# Patient Record
Sex: FEMALE | Race: OTHER | NOT HISPANIC OR LATINO | URBAN - METROPOLITAN AREA
[De-identification: names, ages, dates, MRNs, and addresses within clinical notes are randomized per-mention and may not be internally consistent; named-entity substitution may affect disease eponyms.]

---

## 2023-08-13 ENCOUNTER — EMERGENCY (EMERGENCY)
Facility: HOSPITAL | Age: 88
LOS: 1 days | Discharge: HOME | End: 2023-08-13
Attending: EMERGENCY MEDICINE
Payer: MEDICAID

## 2023-08-13 VITALS
OXYGEN SATURATION: 98 % | SYSTOLIC BLOOD PRESSURE: 199 MMHG | WEIGHT: 88.18 LBS | TEMPERATURE: 98 F | DIASTOLIC BLOOD PRESSURE: 76 MMHG | HEIGHT: 57 IN | RESPIRATION RATE: 16 BRPM | HEART RATE: 62 BPM

## 2023-08-13 LAB
ALBUMIN SERPL ELPH-MCNC: 4.4 G/DL — SIGNIFICANT CHANGE UP (ref 3.3–5)
ALP SERPL-CCNC: 114 U/L — SIGNIFICANT CHANGE UP (ref 40–120)
ALT FLD-CCNC: 15 U/L — SIGNIFICANT CHANGE UP (ref 10–45)
ANION GAP SERPL CALC-SCNC: 12 MMOL/L — SIGNIFICANT CHANGE UP (ref 5–17)
AST SERPL-CCNC: 27 U/L — SIGNIFICANT CHANGE UP (ref 10–40)
BASOPHILS # BLD AUTO: 0.06 K/UL — SIGNIFICANT CHANGE UP (ref 0–0.2)
BASOPHILS NFR BLD AUTO: 0.7 % — SIGNIFICANT CHANGE UP (ref 0–2)
BILIRUB SERPL-MCNC: 0.3 MG/DL — SIGNIFICANT CHANGE UP (ref 0.2–1.2)
BUN SERPL-MCNC: 26 MG/DL — HIGH (ref 7–23)
CALCIUM SERPL-MCNC: 9.1 MG/DL — SIGNIFICANT CHANGE UP (ref 8.4–10.5)
CHLORIDE SERPL-SCNC: 96 MMOL/L — SIGNIFICANT CHANGE UP (ref 96–108)
CO2 SERPL-SCNC: 24 MMOL/L — SIGNIFICANT CHANGE UP (ref 22–31)
CREAT SERPL-MCNC: 1.03 MG/DL — SIGNIFICANT CHANGE UP (ref 0.5–1.3)
EGFR: 52 ML/MIN/1.73M2 — LOW
EOSINOPHIL # BLD AUTO: 0.38 K/UL — SIGNIFICANT CHANGE UP (ref 0–0.5)
EOSINOPHIL NFR BLD AUTO: 4.4 % — SIGNIFICANT CHANGE UP (ref 0–6)
GLUCOSE SERPL-MCNC: 99 MG/DL — SIGNIFICANT CHANGE UP (ref 70–99)
HCT VFR BLD CALC: 36.9 % — SIGNIFICANT CHANGE UP (ref 34.5–45)
HGB BLD-MCNC: 12.3 G/DL — SIGNIFICANT CHANGE UP (ref 11.5–15.5)
IMM GRANULOCYTES NFR BLD AUTO: 0.6 % — SIGNIFICANT CHANGE UP (ref 0–0.9)
LIDOCAIN IGE QN: 60 U/L — SIGNIFICANT CHANGE UP (ref 7–60)
LYMPHOCYTES # BLD AUTO: 3.97 K/UL — HIGH (ref 1–3.3)
LYMPHOCYTES # BLD AUTO: 45.8 % — HIGH (ref 13–44)
MCHC RBC-ENTMCNC: 30.2 PG — SIGNIFICANT CHANGE UP (ref 27–34)
MCHC RBC-ENTMCNC: 33.3 GM/DL — SIGNIFICANT CHANGE UP (ref 32–36)
MCV RBC AUTO: 90.7 FL — SIGNIFICANT CHANGE UP (ref 80–100)
MONOCYTES # BLD AUTO: 0.9 K/UL — SIGNIFICANT CHANGE UP (ref 0–0.9)
MONOCYTES NFR BLD AUTO: 10.4 % — SIGNIFICANT CHANGE UP (ref 2–14)
NEUTROPHILS # BLD AUTO: 3.3 K/UL — SIGNIFICANT CHANGE UP (ref 1.8–7.4)
NEUTROPHILS NFR BLD AUTO: 38.1 % — LOW (ref 43–77)
NRBC # BLD: 0 /100 WBCS — SIGNIFICANT CHANGE UP (ref 0–0)
PLATELET # BLD AUTO: 180 K/UL — SIGNIFICANT CHANGE UP (ref 150–400)
POTASSIUM SERPL-MCNC: 4 MMOL/L — SIGNIFICANT CHANGE UP (ref 3.5–5.3)
POTASSIUM SERPL-SCNC: 4 MMOL/L — SIGNIFICANT CHANGE UP (ref 3.5–5.3)
PROT SERPL-MCNC: 7.5 G/DL — SIGNIFICANT CHANGE UP (ref 6–8.3)
RBC # BLD: 4.07 M/UL — SIGNIFICANT CHANGE UP (ref 3.8–5.2)
RBC # FLD: 13.3 % — SIGNIFICANT CHANGE UP (ref 10.3–14.5)
SODIUM SERPL-SCNC: 132 MMOL/L — LOW (ref 135–145)
WBC # BLD: 8.66 K/UL — SIGNIFICANT CHANGE UP (ref 3.8–10.5)
WBC # FLD AUTO: 8.66 K/UL — SIGNIFICANT CHANGE UP (ref 3.8–10.5)

## 2023-08-13 PROCEDURE — 99285 EMERGENCY DEPT VISIT HI MDM: CPT

## 2023-08-13 RX ORDER — ACETAMINOPHEN 500 MG
975 TABLET ORAL ONCE
Refills: 0 | Status: COMPLETED | OUTPATIENT
Start: 2023-08-13 | End: 2023-08-14

## 2023-08-13 NOTE — ED PROVIDER NOTE - ATTENDING CONTRIBUTION TO CARE
MD Bacon:  patient seen and evaluated personally.   I agree with the History & Physical,  Impression & Plan other than what was detailed in my note.  MD Bacon  89 y/o f hx of "thyroid problems", presents to ed w/ cc of 1 month of r flank/back pain that is radiating to front and now abd pain, also has pain coming from her head down to her back x 1 month, no associated n/v, no diarrhea, no bloody stool, hx of k stones, maybe some burning w/ urination, no cp, sob, no leg swelling, no hemoptysis, afebrile vitals stable  non toxic well appearing, NC/AT,  conjunctiva non conjected, sclera anicteric, moist mucous membranes, neck supple, heart sounds, normal, no mrg, lungs cta b/l no wrr, abd soft non distended w/ mild rlq and sp tenderness, no visual deformities of extremities, axox3, , normal mood and affect, possible uti vs pyelo vs infected stone, less likely pancreatitis, sbo, will get cbc, cmp, lipase, ct scan, ua, pain meds, re eval.

## 2023-08-13 NOTE — ED PROVIDER NOTE - NSFOLLOWUPINSTRUCTIONS_ED_ALL_ED_FT
You were seen in the emergency department for abdominal pain.  Laboratory work and CT scan of your abdomen was performed that was indicative for an ascending urinary tract infection.  This is a common diagnosis however can lead to complications in your kidney.  This is treated with antibiotics, and you will be sent home with an antibiotic to take.  If you develop fevers, chills, chest pain, shortness of breath, increasing abdominal pain, pain with urination, blood in your urine, or increased general pain, please return to the emergency department.     Urinary Tract Infection, Adult    A urinary tract infection (UTI) is an infection of any part of the urinary tract. The urinary tract includes the kidneys, ureters, bladder, and urethra. These organs make, store, and get rid of urine in the body.    An upper UTI affects the ureters and kidneys. A lower UTI affects the bladder and urethra.    What are the causes?  Most urinary tract infections are caused by bacteria in your genital area around your urethra, where urine leaves your body. These bacteria grow and cause inflammation of your urinary tract.    What increases the risk?  You are more likely to develop this condition if:  You have a urinary catheter that stays in place.  You are not able to control when you urinate or have a bowel movement (incontinence).  You are female and you:  Use a spermicide or diaphragm for birth control.  Have low estrogen levels.  Are pregnant.  You have certain genes that increase your risk.  You are sexually active.  You take antibiotic medicines.  You have a condition that causes your flow of urine to slow down, such as:  An enlarged prostate, if you are male.  Blockage in your urethra.  A kidney stone.  A nerve condition that affects your bladder control (neurogenic bladder).  Not getting enough to drink, or not urinating often.  You have certain medical conditions, such as:  Diabetes.  A weak disease-fighting system (immunesystem).  Sickle cell disease.  Gout.  Spinal cord injury.  What are the signs or symptoms?  Symptoms of this condition include:  Needing to urinate right away (urgency).  Frequent urination. This may include small amounts of urine each time you urinate.  Pain or burning with urination.  Blood in the urine.  Urine that smells bad or unusual.  Trouble urinating.  Cloudy urine.  Vaginal discharge, if you are female.  Pain in the abdomen or the lower back.  You may also have:  Vomiting or a decreased appetite.  Confusion.  Irritability or tiredness.  A fever or chills.  Diarrhea.  The first symptom in older adults may be confusion. In some cases, they may not have any symptoms until the infection has worsened.    How is this diagnosed?  This condition is diagnosed based on your medical history and a physical exam. You may also have other tests, including:  Urine tests.  Blood tests.  Tests for STIs (sexually transmitted infections).  If you have had more than one UTI, a cystoscopy or imaging studies may be done to determine the cause of the infections.    How is this treated?  Treatment for this condition includes:  Antibiotic medicine.  Over-the-counter medicines to treat discomfort.  Drinking enough water to stay hydrated.  If you have frequent infections or have other conditions such as a kidney stone, you may need to see a health care provider who specializes in the urinary tract (urologist).    In rare cases, urinary tract infections can cause sepsis. Sepsis is a life-threatening condition that occurs when the body responds to an infection. Sepsis is treated in the hospital with IV antibiotics, fluids, and other medicines.    Follow these instructions at home:    Medicines    Take over-the-counter and prescription medicines only as told by your health care provider.  If you were prescribed an antibiotic medicine, take it as told by your health care provider. Do not stop using the antibiotic even if you start to feel better.  General instructions    Make sure you:  Empty your bladder often and completely. Do not hold urine for long periods of time.  Empty your bladder after sex.  Wipe from front to back after urinating or having a bowel movement if you are female. Use each tissue only one time when you wipe.  Drink enough fluid to keep your urine pale yellow.  Keep all follow-up visits. This is important.  Contact a health care provider if:  Your symptoms do not get better after 1–2 days.  Your symptoms go away and then return.  Get help right away if:  You have severe pain in your back or your lower abdomen.  You have a fever or chills.  You have nausea or vomiting.  Summary  A urinary tract infection (UTI) is an infection of any part of the urinary tract, which includes the kidneys, ureters, bladder, and urethra.  Most urinary tract infections are caused by bacteria in your genital area.  Treatment for this condition often includes antibiotic medicines.  If you were prescribed an antibiotic medicine, take it as told by your health care provider. Do not stop using the antibiotic even if you start to feel better.  Keep all follow-up visits. This is important.  This information is not intended to replace advice given to you by your health care provider. Make sure you discuss any questions you have with your health care provider.

## 2023-08-13 NOTE — ED ADULT NURSE NOTE - OBJECTIVE STATEMENT
89 y/o female A&OX3, came to the ED with complaints of right abdominal pains and cramping, radiating to her right flank, diarrhea, N, dysuria and felt like she had a fever but no temperature was taken. Denies CP, SOB, V, chills, hematuria.

## 2023-08-13 NOTE — ED ADULT NURSE NOTE - NSFALLHARMRISKINTERV_ED_ALL_ED

## 2023-08-13 NOTE — ED PROVIDER NOTE - MUSCULOSKELETAL MINIMAL EXAM
Tenderness elicited over the upper thoracic spine. No step-offs, no obvious signs of trauma./atraumatic

## 2023-08-13 NOTE — ED PROVIDER NOTE - PATIENT PORTAL LINK FT
You can access the FollowMyHealth Patient Portal offered by [facility] by registering at the following website: [email or web address]. By joining [facility]’s FollowMyHealth portal, you will also be able to view your health information using other applications (apps) compatible with our system.

## 2023-08-13 NOTE — ED PROVIDER NOTE - CLINICAL SUMMARY MEDICAL DECISION MAKING FREE TEXT BOX
Faina Brad is an 88 y.o F with PMHx significant for hypothyroidism, HTN, who presents to the ED with ongoing upper abd pain x2 weeks Faina Salinas is an 88 y.o F with PMHx significant for hypothyroidism, HTN, who presents to the ED with ongoing upper abd pain x2 weeks. Concerning differentials included but not limited to UTI, pyelonephritis, appendicitis, gastritis, cholecystitis. Will obtain basic labs, lipase, UA, CT A/P to r/o intraabdominal pathology.

## 2023-08-13 NOTE — ED PROVIDER NOTE - NSPTACCESSSVCSAPPTDETAILS_ED_ALL_ED_FT
This patient is from [facility] and currently does not have a primary care physician.  Family is trying to help with insurance however they are running its difficulties.  Please help

## 2023-08-13 NOTE — ED PROVIDER NOTE - PROGRESS NOTE DETAILS
Informed of results of labs and CT scan.  Indicative of ascending UTI. Will provide antibiotics at discharge for treatment of UTI

## 2023-08-13 NOTE — ED PROVIDER NOTE - OBJECTIVE STATEMENT
Faina Salinas is an 88 y.o F with PMHx significant for hypothyroidism, HTN, who presents to the ED with ongoing upper abd pain x2 weeks.  Patient is a native M[place]ese speaker from [place] who is present with her granddaughter who provides the HPI.  Per granddaughter, Ms. Salinas has had upper abd pain that radiates to her flanks bilaterally.  She reports nothing has made it particularly worse however, she has had minimal relief after taking Tylenol. Additionally, she complains of a HA that is located at the top of her head without photophobia and burning with urination. She currently continues to c.o. a HA. She otherwise denies being sick or around any sick contacts, denies F/C, N/V, SOB, CP, hematuria, constipation, diarrhea.  Granddaughter does report the bilateral cramping of the legs or arthralgias.

## 2023-08-14 VITALS
HEART RATE: 57 BPM | SYSTOLIC BLOOD PRESSURE: 173 MMHG | TEMPERATURE: 98 F | DIASTOLIC BLOOD PRESSURE: 65 MMHG | OXYGEN SATURATION: 97 % | RESPIRATION RATE: 18 BRPM

## 2023-08-14 LAB
APPEARANCE UR: CLEAR — SIGNIFICANT CHANGE UP
BACTERIA # UR AUTO: NEGATIVE — SIGNIFICANT CHANGE UP
BILIRUB UR-MCNC: NEGATIVE — SIGNIFICANT CHANGE UP
COLOR SPEC: COLORLESS — SIGNIFICANT CHANGE UP
DIFF PNL FLD: ABNORMAL
EPI CELLS # UR: 0 /HPF — SIGNIFICANT CHANGE UP
GLUCOSE UR QL: NEGATIVE — SIGNIFICANT CHANGE UP
KETONES UR-MCNC: NEGATIVE — SIGNIFICANT CHANGE UP
LEUKOCYTE ESTERASE UR-ACNC: ABNORMAL
NITRITE UR-MCNC: NEGATIVE — SIGNIFICANT CHANGE UP
PH UR: 6 — SIGNIFICANT CHANGE UP (ref 5–8)
PROT UR-MCNC: NEGATIVE — SIGNIFICANT CHANGE UP
RBC CASTS # UR COMP ASSIST: 1 /HPF — SIGNIFICANT CHANGE UP (ref 0–4)
SP GR SPEC: 1.01 — LOW (ref 1.01–1.02)
UROBILINOGEN FLD QL: NEGATIVE — SIGNIFICANT CHANGE UP
WBC UR QL: 5 /HPF — SIGNIFICANT CHANGE UP (ref 0–5)

## 2023-08-14 PROCEDURE — 81001 URINALYSIS AUTO W/SCOPE: CPT

## 2023-08-14 PROCEDURE — 74177 CT ABD & PELVIS W/CONTRAST: CPT | Mod: MA

## 2023-08-14 PROCEDURE — 74177 CT ABD & PELVIS W/CONTRAST: CPT | Mod: 26,MA

## 2023-08-14 PROCEDURE — 99285 EMERGENCY DEPT VISIT HI MDM: CPT | Mod: 25

## 2023-08-14 PROCEDURE — 96374 THER/PROPH/DIAG INJ IV PUSH: CPT

## 2023-08-14 PROCEDURE — 85025 COMPLETE CBC W/AUTO DIFF WBC: CPT

## 2023-08-14 PROCEDURE — 83690 ASSAY OF LIPASE: CPT

## 2023-08-14 PROCEDURE — 87086 URINE CULTURE/COLONY COUNT: CPT

## 2023-08-14 PROCEDURE — 80053 COMPREHEN METABOLIC PANEL: CPT

## 2023-08-14 RX ORDER — CEFDINIR 250 MG/5ML
1 POWDER, FOR SUSPENSION ORAL
Qty: 14 | Refills: 0
Start: 2023-08-14 | End: 2023-08-20

## 2023-08-14 RX ORDER — CEFTRIAXONE 500 MG/1
1000 INJECTION, POWDER, FOR SOLUTION INTRAMUSCULAR; INTRAVENOUS ONCE
Refills: 0 | Status: COMPLETED | OUTPATIENT
Start: 2023-08-14 | End: 2023-08-14

## 2023-08-14 RX ADMIN — Medication 975 MILLIGRAM(S): at 06:57

## 2023-08-14 RX ADMIN — Medication 975 MILLIGRAM(S): at 00:18

## 2023-08-14 RX ADMIN — CEFTRIAXONE 100 MILLIGRAM(S): 500 INJECTION, POWDER, FOR SOLUTION INTRAMUSCULAR; INTRAVENOUS at 06:00

## 2023-08-15 LAB
CULTURE RESULTS: SIGNIFICANT CHANGE UP
SPECIMEN SOURCE: SIGNIFICANT CHANGE UP

## 2023-08-16 NOTE — ED POST DISCHARGE NOTE - ADDITIONAL DOCUMENTATION
8/18/23 Alejandro BOOTH- daughter called back. via  , informed her of results. mother is feeling much better. seems to have responded appropriately to the antibiotic. pt gets some abdominal pain when taking the antibiotic but otherwise is feeling better. encouraged probiotic. urinary symptoms improved. encouraged continue antibiotic and to f/up outpatient with pcp. medicine clinic information given to her to set up follow up. instructed to return to hospital if worsening symptoms. 8/18/23 Alejandro BOOTH- daughter called back. via  #[phone] , informed her of results. mother is feeling much better. seems to have responded appropriately to the antibiotic. pt gets some abdominal pain when taking the antibiotic but otherwise is feeling better. encouraged probiotic. urinary symptoms improved. encouraged continue antibiotic and to f/up outpatient with pcp. medicine clinic information given to her to set up follow up. instructed to return to hospital if worsening symptoms.

## 2023-08-16 NOTE — ED POST DISCHARGE NOTE - DETAILS
8/16: sent on abx, would ensure patient feeling improved. lvm using language line  Libby [id number] 8/17/23 Alejandro BOOTH - via  #[id number], no answer, left v/m. pt on cefuroxime. 8/17/23 Alejandro BOOTH - via  #[id number], no answer, left v/m. pt on cefdinir. 8/18/23 Alejandro BOOTH- via  #[phone], attempted, no answer, left v/m on primary number. also attempted granddaughter, no answer. left v/m via . Cefdinir should be an appropriate management of Group B strep. no further attempts to contact patient necessary at this point.

## 2023-09-08 ENCOUNTER — INPATIENT (INPATIENT)
Facility: HOSPITAL | Age: 88
LOS: 6 days | Discharge: HOME HEALTH | DRG: 643 | End: 2023-09-15
Attending: STUDENT IN AN ORGANIZED HEALTH CARE EDUCATION/TRAINING PROGRAM | Admitting: STUDENT IN AN ORGANIZED HEALTH CARE EDUCATION/TRAINING PROGRAM
Payer: MEDICAID

## 2023-09-08 VITALS
HEIGHT: 59 IN | OXYGEN SATURATION: 99 % | RESPIRATION RATE: 18 BRPM | SYSTOLIC BLOOD PRESSURE: 155 MMHG | HEART RATE: 55 BPM | DIASTOLIC BLOOD PRESSURE: 84 MMHG | TEMPERATURE: 99 F | WEIGHT: 89.95 LBS

## 2023-09-08 LAB
ALBUMIN SERPL ELPH-MCNC: 4.5 G/DL — SIGNIFICANT CHANGE UP (ref 3.3–5)
ALP SERPL-CCNC: 92 U/L — SIGNIFICANT CHANGE UP (ref 40–120)
ALT FLD-CCNC: 19 U/L — SIGNIFICANT CHANGE UP (ref 10–45)
ANION GAP SERPL CALC-SCNC: 13 MMOL/L — SIGNIFICANT CHANGE UP (ref 5–17)
APPEARANCE UR: CLEAR — SIGNIFICANT CHANGE UP
AST SERPL-CCNC: 32 U/L — SIGNIFICANT CHANGE UP (ref 10–40)
BACTERIA # UR AUTO: NEGATIVE — SIGNIFICANT CHANGE UP
BASOPHILS # BLD AUTO: 0.01 K/UL — SIGNIFICANT CHANGE UP (ref 0–0.2)
BASOPHILS NFR BLD AUTO: 0.2 % — SIGNIFICANT CHANGE UP (ref 0–2)
BILIRUB SERPL-MCNC: 0.6 MG/DL — SIGNIFICANT CHANGE UP (ref 0.2–1.2)
BILIRUB UR-MCNC: NEGATIVE — SIGNIFICANT CHANGE UP
BUN SERPL-MCNC: 8 MG/DL — SIGNIFICANT CHANGE UP (ref 7–23)
CALCIUM SERPL-MCNC: 9.2 MG/DL — SIGNIFICANT CHANGE UP (ref 8.4–10.5)
CHLORIDE SERPL-SCNC: 89 MMOL/L — LOW (ref 96–108)
CO2 SERPL-SCNC: 22 MMOL/L — SIGNIFICANT CHANGE UP (ref 22–31)
COLOR SPEC: COLORLESS — SIGNIFICANT CHANGE UP
CREAT SERPL-MCNC: 0.68 MG/DL — SIGNIFICANT CHANGE UP (ref 0.5–1.3)
DIFF PNL FLD: NEGATIVE — SIGNIFICANT CHANGE UP
EGFR: 84 ML/MIN/1.73M2 — SIGNIFICANT CHANGE UP
EOSINOPHIL # BLD AUTO: 0.08 K/UL — SIGNIFICANT CHANGE UP (ref 0–0.5)
EOSINOPHIL NFR BLD AUTO: 1.3 % — SIGNIFICANT CHANGE UP (ref 0–6)
EPI CELLS # UR: 1 /HPF — SIGNIFICANT CHANGE UP
GLUCOSE SERPL-MCNC: 113 MG/DL — HIGH (ref 70–99)
GLUCOSE UR QL: NEGATIVE — SIGNIFICANT CHANGE UP
HCT VFR BLD CALC: 36.4 % — SIGNIFICANT CHANGE UP (ref 34.5–45)
HGB BLD-MCNC: 12.7 G/DL — SIGNIFICANT CHANGE UP (ref 11.5–15.5)
HYALINE CASTS # UR AUTO: 0 /LPF — SIGNIFICANT CHANGE UP (ref 0–2)
IMM GRANULOCYTES NFR BLD AUTO: 0.8 % — SIGNIFICANT CHANGE UP (ref 0–0.9)
KETONES UR-MCNC: NEGATIVE — SIGNIFICANT CHANGE UP
LEUKOCYTE ESTERASE UR-ACNC: ABNORMAL
LIDOCAIN IGE QN: 59 U/L — SIGNIFICANT CHANGE UP (ref 7–60)
LYMPHOCYTES # BLD AUTO: 1.91 K/UL — SIGNIFICANT CHANGE UP (ref 1–3.3)
LYMPHOCYTES # BLD AUTO: 30.9 % — SIGNIFICANT CHANGE UP (ref 13–44)
MCHC RBC-ENTMCNC: 30.3 PG — SIGNIFICANT CHANGE UP (ref 27–34)
MCHC RBC-ENTMCNC: 34.9 GM/DL — SIGNIFICANT CHANGE UP (ref 32–36)
MCV RBC AUTO: 86.9 FL — SIGNIFICANT CHANGE UP (ref 80–100)
MONOCYTES # BLD AUTO: 1.01 K/UL — HIGH (ref 0–0.9)
MONOCYTES NFR BLD AUTO: 16.3 % — HIGH (ref 2–14)
NEUTROPHILS # BLD AUTO: 3.13 K/UL — SIGNIFICANT CHANGE UP (ref 1.8–7.4)
NEUTROPHILS NFR BLD AUTO: 50.5 % — SIGNIFICANT CHANGE UP (ref 43–77)
NITRITE UR-MCNC: NEGATIVE — SIGNIFICANT CHANGE UP
NRBC # BLD: 0 /100 WBCS — SIGNIFICANT CHANGE UP (ref 0–0)
PH UR: 7.5 — SIGNIFICANT CHANGE UP (ref 5–8)
PLATELET # BLD AUTO: 314 K/UL — SIGNIFICANT CHANGE UP (ref 150–400)
POTASSIUM SERPL-MCNC: 3.7 MMOL/L — SIGNIFICANT CHANGE UP (ref 3.5–5.3)
POTASSIUM SERPL-SCNC: 3.7 MMOL/L — SIGNIFICANT CHANGE UP (ref 3.5–5.3)
PROT SERPL-MCNC: 8.3 G/DL — SIGNIFICANT CHANGE UP (ref 6–8.3)
PROT UR-MCNC: SIGNIFICANT CHANGE UP
RBC # BLD: 4.19 M/UL — SIGNIFICANT CHANGE UP (ref 3.8–5.2)
RBC # FLD: 13 % — SIGNIFICANT CHANGE UP (ref 10.3–14.5)
RBC CASTS # UR COMP ASSIST: 2 /HPF — SIGNIFICANT CHANGE UP (ref 0–4)
SODIUM SERPL-SCNC: 124 MMOL/L — LOW (ref 135–145)
SP GR SPEC: 1.01 — LOW (ref 1.01–1.02)
TROPONIN T, HIGH SENSITIVITY RESULT: 14 NG/L — SIGNIFICANT CHANGE UP (ref 0–51)
UROBILINOGEN FLD QL: NEGATIVE — SIGNIFICANT CHANGE UP
WBC # BLD: 6.19 K/UL — SIGNIFICANT CHANGE UP (ref 3.8–10.5)
WBC # FLD AUTO: 6.19 K/UL — SIGNIFICANT CHANGE UP (ref 3.8–10.5)
WBC UR QL: 3 /HPF — SIGNIFICANT CHANGE UP (ref 0–5)

## 2023-09-08 PROCEDURE — 71045 X-RAY EXAM CHEST 1 VIEW: CPT | Mod: 26

## 2023-09-08 PROCEDURE — 74177 CT ABD & PELVIS W/CONTRAST: CPT | Mod: 26,MA

## 2023-09-08 PROCEDURE — 99285 EMERGENCY DEPT VISIT HI MDM: CPT

## 2023-09-08 NOTE — ED ADULT NURSE NOTE - OBJECTIVE STATEMENT
87YO female with PMH o HTN and osteoporosis presenting with complaints of  abdominal pain. Pt states for the last four days, having epigastric pain, on saturday pt noticed in BM dark blood in stool, pt also had one episode of diarrhea on saturday. Pt was recently dx with UTI finished course of treatment is still having burning upon urination. Pt c/o epigastric pain, nausea, SOB and overweakness. As per daughter pt seeems a bit confused at times, normally A&Ox4 at baseline, pt has also decreased PO intake do to abdominal discomfort. Pt Axox3 not to year, respirations even, & non-labored. radial pulses strong and equal bilaterally. Skin warm, dry, and intact. Pt placed in position of comfort. Daughter at bedside. Bed in lowest position, wheels locked, appropriate side rails raised. Pt denies needs at this time.

## 2023-09-08 NOTE — ED PROVIDER NOTE - CLINICAL SUMMARY MEDICAL DECISION MAKING FREE TEXT BOX
88-year-old female with history of HTN, hypothyroidism presents with 1 week of increasing in severity abdominal pain associated with 1 episode of blood in her stool and nausea.  Concern for colitis versus GIB versus chronic UTI.  Labs, EKG, CXR, CT A/P, UA/urine culture ordered.  Likely admission for ongoing management.

## 2023-09-08 NOTE — ED ADULT NURSE NOTE - NS ED NURSE PATIENT LEFT UNIT TIME
"Pt arrived to ER with complaints of " I have been exercising and I feel like my legs are broken". Pt reports pain in the right knee, rates it as 5/10 at present. Pt reports increased pain after waking up in the morning.   " 08:30

## 2023-09-08 NOTE — ED PROVIDER NOTE - PROGRESS NOTE DETAILS
Endorsed to Dr JOVANNI Casper MD, Facep Patient signed out to me, clinically stable.  Given altered mental status will obtain thyroid panel as well as urine lytes   To better assess hyponatremia.  Spoke to granddaughter who is at bedside, states that she is unsure of what blood pressure medications he takes.  Will contact family member to check.  CT results still pending, plan for admission after. Mynor Hammer, ED Attending Martha Randle- spoke to hospitalist for admission. updated family

## 2023-09-08 NOTE — ED PROVIDER NOTE - PHYSICAL EXAMINATION
GENERAL: well appearing in no acute distress, non-toxic appearing  HEAD: normocephalic, atraumatic  HEENT: normal conjunctiva, oral mucosa dry, uvula midline, neck supple  CARDIAC: regular rate and rhythm, normal S1S2, no appreciable murmurs  PULM: normal breath sounds, clear to ascultation bilaterally, no rales, rhonchi, wheezing  GI: abdomen nondistended, soft, nontender  : no CVA tenderness b/l, no suprapubic tenderness  NEURO: moving all 4 extremities, no focal deficits, normal speech, AOx3  MSK: no peripheral edema, no calf tenderness b/l  SKIN: well-perfused, extremities warm

## 2023-09-08 NOTE — ED PROVIDER NOTE - OBJECTIVE STATEMENT
89 yo F with history of HTN, hypothyroidism presents with 1 week of abd pain.  Patient and daughter report that she was admitted to the hospital about a week ago for a urinary tract infection discharged with antibiotics and was feeling improved. Today she presents with abdominal pain and concern for lightheadedness and presyncope.  She had a fever 2 days ago and tested positive for COVID about 2 weeks ago.  She has had ongoing symptoms of burning with urination and nausea but no vomiting.  She also noticed some blood in her stool couple of days ago.  Otherwise patient has no headache, chest pain, shortness of breath, peripheral edema.

## 2023-09-08 NOTE — ED PROVIDER NOTE - ATTENDING CONTRIBUTION TO CARE
Private Physician None  88y hisp female PMH HTN, H Private Physician None  Declined translation services translated by deshawn  88y hisp female PMH HTN, Hypothyrodism see last mo (different registration Faina Salinas [id number]) Had presented w abd pn dx as abd pain ct ?cystitis cultures positive for GBS, Pt was dc home on abx and had improved. Pt now comes to ed c/o abd pain , burning dizziness w near syncope today. Had fever 101 two day ago . Had tested positive for covid 10d ago. Has burning dysuria, chills, nausea w/o vomiting. Private Physician None  Declined translation services translated by deshawn  88y hisp female PMH Born [facility],  HTN, Hypothyrodism see last mo (different registration Faina Salinas [id number]) Had presented w abd pn dx as abd pain ct ?cystitis cultures positive for GBS, Pt was dc home on abx and had improved. Pt now comes to ed c/o abd pain , burning dizziness w near syncope today. Had fever 101 two day ago . Had tested positive for covid 10d ago. Has burning dysuria, chills, nausea w/o vomiting. +blood in stool few days ago. PE Eldelry feamle awake alert normocephalic atraumatic neck supple chest clear anterior & posterior cv no rubs, gallops or murmurs abd mild suprapubic ttp no rebound guarding masses. Neruo gcs 15 speech fluent power 5/5 all extr  Reynaldo Casper MD, Facep

## 2023-09-09 DIAGNOSIS — N39.0 URINARY TRACT INFECTION, SITE NOT SPECIFIED: ICD-10-CM

## 2023-09-09 DIAGNOSIS — E87.1 HYPO-OSMOLALITY AND HYPONATREMIA: ICD-10-CM

## 2023-09-09 DIAGNOSIS — R10.9 UNSPECIFIED ABDOMINAL PAIN: ICD-10-CM

## 2023-09-09 DIAGNOSIS — Z29.9 ENCOUNTER FOR PROPHYLACTIC MEASURES, UNSPECIFIED: ICD-10-CM

## 2023-09-09 DIAGNOSIS — I10 ESSENTIAL (PRIMARY) HYPERTENSION: ICD-10-CM

## 2023-09-09 DIAGNOSIS — E03.9 HYPOTHYROIDISM, UNSPECIFIED: ICD-10-CM

## 2023-09-09 LAB
ANION GAP SERPL CALC-SCNC: 14 MMOL/L — SIGNIFICANT CHANGE UP (ref 5–17)
APPEARANCE UR: CLEAR — SIGNIFICANT CHANGE UP
BACTERIA # UR AUTO: NEGATIVE — SIGNIFICANT CHANGE UP
BILIRUB UR-MCNC: NEGATIVE — SIGNIFICANT CHANGE UP
BUN SERPL-MCNC: 7 MG/DL — SIGNIFICANT CHANGE UP (ref 7–23)
CALCIUM SERPL-MCNC: 9.4 MG/DL — SIGNIFICANT CHANGE UP (ref 8.4–10.5)
CHLORIDE SERPL-SCNC: 87 MMOL/L — LOW (ref 96–108)
CO2 SERPL-SCNC: 22 MMOL/L — SIGNIFICANT CHANGE UP (ref 22–31)
COLOR SPEC: COLORLESS — SIGNIFICANT CHANGE UP
CREAT ?TM UR-MCNC: 23 MG/DL — SIGNIFICANT CHANGE UP
CREAT SERPL-MCNC: 0.69 MG/DL — SIGNIFICANT CHANGE UP (ref 0.5–1.3)
DIFF PNL FLD: NEGATIVE — SIGNIFICANT CHANGE UP
EGFR: 83 ML/MIN/1.73M2 — SIGNIFICANT CHANGE UP
EPI CELLS # UR: 2 /HPF — SIGNIFICANT CHANGE UP
GLUCOSE SERPL-MCNC: 165 MG/DL — HIGH (ref 70–99)
GLUCOSE UR QL: NEGATIVE — SIGNIFICANT CHANGE UP
HCT VFR BLD CALC: 35.9 % — SIGNIFICANT CHANGE UP (ref 34.5–45)
HGB BLD-MCNC: 12.6 G/DL — SIGNIFICANT CHANGE UP (ref 11.5–15.5)
HYALINE CASTS # UR AUTO: 1 /LPF — SIGNIFICANT CHANGE UP (ref 0–2)
KETONES UR-MCNC: NEGATIVE — SIGNIFICANT CHANGE UP
LEUKOCYTE ESTERASE UR-ACNC: ABNORMAL
MCHC RBC-ENTMCNC: 29.8 PG — SIGNIFICANT CHANGE UP (ref 27–34)
MCHC RBC-ENTMCNC: 35.1 GM/DL — SIGNIFICANT CHANGE UP (ref 32–36)
MCV RBC AUTO: 84.9 FL — SIGNIFICANT CHANGE UP (ref 80–100)
NITRITE UR-MCNC: NEGATIVE — SIGNIFICANT CHANGE UP
NRBC # BLD: 0 /100 WBCS — SIGNIFICANT CHANGE UP (ref 0–0)
OSMOLALITY UR: 203 MOS/KG — LOW (ref 300–900)
PH UR: 7.5 — SIGNIFICANT CHANGE UP (ref 5–8)
PLATELET # BLD AUTO: 326 K/UL — SIGNIFICANT CHANGE UP (ref 150–400)
POTASSIUM SERPL-MCNC: 3.7 MMOL/L — SIGNIFICANT CHANGE UP (ref 3.5–5.3)
POTASSIUM SERPL-SCNC: 3.7 MMOL/L — SIGNIFICANT CHANGE UP (ref 3.5–5.3)
POTASSIUM UR-SCNC: 14 MMOL/L — SIGNIFICANT CHANGE UP
PROT ?TM UR-MCNC: 15 MG/DL — HIGH (ref 0–12)
PROT UR-MCNC: NEGATIVE — SIGNIFICANT CHANGE UP
PROT/CREAT UR-RTO: 0.7 RATIO — HIGH (ref 0–0.2)
RAPID RVP RESULT: DETECTED
RBC # BLD: 4.23 M/UL — SIGNIFICANT CHANGE UP (ref 3.8–5.2)
RBC # FLD: 12.8 % — SIGNIFICANT CHANGE UP (ref 10.3–14.5)
RBC CASTS # UR COMP ASSIST: 2 /HPF — SIGNIFICANT CHANGE UP (ref 0–4)
SARS-COV-2 RNA SPEC QL NAA+PROBE: DETECTED
SODIUM SERPL-SCNC: 123 MMOL/L — LOW (ref 135–145)
SODIUM UR-SCNC: 67 MMOL/L — SIGNIFICANT CHANGE UP
SP GR SPEC: 1 — LOW (ref 1.01–1.02)
T3 SERPL-MCNC: 95 NG/DL — SIGNIFICANT CHANGE UP (ref 80–200)
T4 AB SER-ACNC: 9.4 UG/DL — SIGNIFICANT CHANGE UP (ref 4.6–12)
T4 FREE SERPL-MCNC: 1.7 NG/DL — SIGNIFICANT CHANGE UP (ref 0.9–1.8)
TSH SERPL-MCNC: 1.89 UIU/ML — SIGNIFICANT CHANGE UP (ref 0.27–4.2)
UROBILINOGEN FLD QL: NEGATIVE — SIGNIFICANT CHANGE UP
UUN UR-MCNC: 154 MG/DL — SIGNIFICANT CHANGE UP
WBC # BLD: 5.92 K/UL — SIGNIFICANT CHANGE UP (ref 3.8–10.5)
WBC # FLD AUTO: 5.92 K/UL — SIGNIFICANT CHANGE UP (ref 3.8–10.5)
WBC UR QL: 2 /HPF — SIGNIFICANT CHANGE UP (ref 0–5)

## 2023-09-09 PROCEDURE — 99222 1ST HOSP IP/OBS MODERATE 55: CPT | Mod: GC

## 2023-09-09 PROCEDURE — 99223 1ST HOSP IP/OBS HIGH 75: CPT

## 2023-09-09 RX ORDER — ACETAMINOPHEN 500 MG
650 TABLET ORAL EVERY 6 HOURS
Refills: 0 | Status: DISCONTINUED | OUTPATIENT
Start: 2023-09-09 | End: 2023-09-15

## 2023-09-09 RX ORDER — ENOXAPARIN SODIUM 100 MG/ML
40 INJECTION SUBCUTANEOUS EVERY 24 HOURS
Refills: 0 | Status: DISCONTINUED | OUTPATIENT
Start: 2023-09-09 | End: 2023-09-15

## 2023-09-09 RX ORDER — NEBIVOLOL HYDROCHLORIDE 5 MG/1
5 TABLET ORAL DAILY
Refills: 0 | Status: DISCONTINUED | OUTPATIENT
Start: 2023-09-09 | End: 2023-09-15

## 2023-09-09 RX ORDER — INFLUENZA VIRUS VACCINE 15; 15; 15; 15 UG/.5ML; UG/.5ML; UG/.5ML; UG/.5ML
0.7 SUSPENSION INTRAMUSCULAR ONCE
Refills: 0 | Status: DISCONTINUED | OUTPATIENT
Start: 2023-09-09 | End: 2023-09-15

## 2023-09-09 RX ORDER — LEVOTHYROXINE SODIUM 125 MCG
25 TABLET ORAL DAILY
Refills: 0 | Status: DISCONTINUED | OUTPATIENT
Start: 2023-09-09 | End: 2023-09-15

## 2023-09-09 RX ORDER — SODIUM CHLORIDE 9 MG/ML
1000 INJECTION, SOLUTION INTRAVENOUS
Refills: 0 | Status: DISCONTINUED | OUTPATIENT
Start: 2023-09-09 | End: 2023-09-09

## 2023-09-09 RX ORDER — TRAMADOL HYDROCHLORIDE 50 MG/1
25 TABLET ORAL DAILY
Refills: 0 | Status: DISCONTINUED | OUTPATIENT
Start: 2023-09-09 | End: 2023-09-14

## 2023-09-09 RX ORDER — SODIUM CHLORIDE 9 MG/ML
250 INJECTION INTRAMUSCULAR; INTRAVENOUS; SUBCUTANEOUS
Refills: 0 | Status: DISCONTINUED | OUTPATIENT
Start: 2023-09-09 | End: 2023-09-10

## 2023-09-09 RX ORDER — LANOLIN ALCOHOL/MO/W.PET/CERES
3 CREAM (GRAM) TOPICAL AT BEDTIME
Refills: 0 | Status: DISCONTINUED | OUTPATIENT
Start: 2023-09-09 | End: 2023-09-15

## 2023-09-09 RX ORDER — PANTOPRAZOLE SODIUM 20 MG/1
40 TABLET, DELAYED RELEASE ORAL
Refills: 0 | Status: DISCONTINUED | OUTPATIENT
Start: 2023-09-09 | End: 2023-09-15

## 2023-09-09 RX ORDER — ONDANSETRON 8 MG/1
4 TABLET, FILM COATED ORAL EVERY 8 HOURS
Refills: 0 | Status: DISCONTINUED | OUTPATIENT
Start: 2023-09-09 | End: 2023-09-15

## 2023-09-09 RX ADMIN — SODIUM CHLORIDE 50 MILLILITER(S): 9 INJECTION INTRAMUSCULAR; INTRAVENOUS; SUBCUTANEOUS at 20:56

## 2023-09-09 RX ADMIN — SODIUM CHLORIDE 50 MILLILITER(S): 9 INJECTION, SOLUTION INTRAVENOUS at 14:14

## 2023-09-09 RX ADMIN — ENOXAPARIN SODIUM 40 MILLIGRAM(S): 100 INJECTION SUBCUTANEOUS at 21:09

## 2023-09-09 NOTE — H&P ADULT - PROBLEM SELECTOR PLAN 1
- CT A/P No acute intra-abdominal or pelvic pathology.  - Hgb stable, tremd   - Stool culture, o/p penidng   - Recently COVID +  - Will continue to monitor for symptoms, now has soft stools but had diarrhea with blood   - Unclear when last colonoscopy was   - Consult GI if worsening, changes in Hgb    Recently   COVID + last week

## 2023-09-09 NOTE — H&P ADULT - NSHPPHYSICALEXAM_GEN_ALL_CORE
Vital Signs Last 24 Hrs  T(C): 36.9 (09 Sep 2023 08:47), Max: 37.1 (09 Sep 2023 01:26)  T(F): 98.4 (09 Sep 2023 08:47), Max: 98.7 (09 Sep 2023 01:26)  HR: 64 (09 Sep 2023 08:47) (56 - 64)  BP: 164/83 (09 Sep 2023 08:47) (158/60 - 182/82)  BP(mean): 116 (08 Sep 2023 21:42) (116 - 116)  RR: 18 (09 Sep 2023 08:47) (16 - 18)  SpO2: 100% (09 Sep 2023 08:47) (100% - 100%)    Parameters below as of 09 Sep 2023 08:47  Patient On (Oxygen Delivery Method): room air        CONSTITUTIONAL: Well-groomed, in no apparent distress, wearing sweater  EYES: No conjunctival or scleral injection, non-icteric;   ENMT: No external nasal lesions; MMM  NECK: Trachea midline   RESPIRATORY: Breathing comfortably; no dullness to percussion  CARDIOVASCULAR: +S1S2, RRR, no M/G/R  GASTROINTESTINAL: No palpable masses or tenderness, +BS throughout  LYMPHATIC: No cervical LAD or tenderness  SKIN: No rashes or ulcers noted  NEUROLOGIC: Sensation intact in LEs b/l to light touch  PSYCHIATRIC: Alert

## 2023-09-09 NOTE — CONSULT NOTE ADULT - SUBJECTIVE AND OBJECTIVE BOX
[facility] DIVISION OF KIDNEY DISEASES AND HYPERTENSION -- [phone]  -- INITIAL CONSULT NOTE  --------------------------------------------------------------------------------  HPI: 89 y/o F with PMH of HTN and osteoporosis presenting with complaints of  abdominal pain, dark blood in stool, one episode of diarrhea and found to be hyponatremic with Na 124, for which nephrology service was consulted. History obtained with help of granddaughter who translated from Nepali. Pt was recently dx with UTI finished course of treatment is still having burning upon urination. She and her family also recently tested pos for COVID 2-3 wks ago and just tok OTC tx. Patient is COVID+ in ED but no resp symptoms. She has had poor PO intake and hydration 2/2 abdominal pain.        PAST HISTORY  --------------------------------------------------------------------------------  PAST MEDICAL & SURGICAL HISTORY:  Hypertension      Hypothyroidism        FAMILY HISTORY:    PAST SOCIAL HISTORY:    ALLERGIES & MEDICATIONS  --------------------------------------------------------------------------------  Allergies    No Known Allergies    Intolerances      Standing Inpatient Medications  enoxaparin Injectable 40 milliGRAM(s) SubCutaneous every 24 hours  influenza  Vaccine (HIGH DOSE) 0.7 milliLiter(s) IntraMuscular once  levothyroxine 25 MICROGram(s) Oral daily  nebivolol 5 milliGRAM(s) Oral daily  pantoprazole    Tablet 40 milliGRAM(s) Oral before breakfast  trimethoprim  160 mG/sulfamethoxazole 800 mG 1 Tablet(s) Oral two times a day    PRN Inpatient Medications  acetaminophen     Tablet .. 650 milliGRAM(s) Oral every 6 hours PRN  aluminum hydroxide/magnesium hydroxide/simethicone Suspension 30 milliLiter(s) Oral every 4 hours PRN  melatonin 3 milliGRAM(s) Oral at bedtime PRN  ondansetron Injectable 4 milliGRAM(s) IV Push every 8 hours PRN      REVIEW OF SYSTEMS  --------------------------------------------------------------------------------  see HPI    VITALS/PHYSICAL EXAM  --------------------------------------------------------------------------------  T(C): 36.9 (09-09-23 @ 20:04), Max: 37.1 (09-09-23 @ 01:26)  HR: 73 (09-09-23 @ 20:04) (56 - 73)  BP: 169/77 (09-09-23 @ 19:24) (158/60 - 182/82)  RR: 18 (09-09-23 @ 20:04) (16 - 18)  SpO2: 97% (09-09-23 @ 20:04) (97% - 100%)  Wt(kg): --  Height (cm): 149.9 (09-08-23 @ 15:27)  Weight (kg): 40.8 (09-08-23 @ 15:27)  BMI (kg/m2): 18.2 (09-08-23 @ 15:27)  BSA (m2): 1.31 (09-08-23 @ 15:27)    General: NAD  Neuro: No focal deficits  Neck: no JVD  Pulmonary: Lungs CTA B/L  Cardiovascular/Chest: +S1S2, RRR  GI/Abdomen: Soft, non-distended, non-tender, +bowel sounds  Extremities: No LE pitting edema B/L  Skin: Warm and dry    LABS/STUDIES  --------------------------------------------------------------------------------              12.6   5.92  >-----------<  326      [09-09-23 @ 18:24]              35.9     123  |  87  |  7   ----------------------------<  165      [09-09-23 @ 18:24]  3.7   |  22  |  0.69        Ca     9.4     [09-09-23 @ 18:24]    TPro  8.3  /  Alb  4.5  /  TBili  0.6  /  DBili  x   /  AST  32  /  ALT  19  /  AlkPhos  92  [09-08-23 @ 22:15]          Creatinine Trend:  SCr 0.69 [09-09 @ 18:24]  SCr 0.68 [09-08 @ 22:15]    Urinalysis - [09-09-23 @ 18:24]      Color  / Appearance  / SG  / pH       Gluc 165 / Ketone   / Bili  / Urobili        Blood  / Protein  / Leuk Est  / Nitrite       RBC  / WBC  / Hyaline  / Gran  / Sq Epi  / Non Sq Epi  / Bacteria     Urine Creatinine 23      [09-09-23 @ 00:40]  Urine Protein 15      [09-09-23 @ 00:40]  Urine Sodium 67      [09-09-23 @ 00:40]  Urine Urea Nitrogen 154      [09-09-23 @ 00:40]  Urine Potassium 14      [09-09-23 @ 00:40]  Urine Osmolality 203      [09-09-23 @ 00:40]

## 2023-09-09 NOTE — CONSULT NOTE ADULT - PROBLEM SELECTOR RECOMMENDATION 9
Urine studies reviewed. 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     Plan:  Give NS at 50cc/hr x 5hr and recheck BMP at midnight.  Will follow.    Thank you for involving us in the care of this patient.  Please call with any additional questions.  Carlie Skinner DO  Nephrology Fellow  Contact me directly on TEAMS  (After 5pm or on weekends, please page the on-call fellow) Urine studies reviewed. 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     Plan:  Start salt tabs 1 gm TID  Please repeat BMP every 4-6 hours  Will follow.    Thank you for involving us in the care of this patient.  Please call with any additional questions.  Carlie Skinner DO  Nephrology Fellow  Contact me directly on TEAMS  (After 5pm or on weekends, please page the on-call fellow)

## 2023-09-09 NOTE — H&P ADULT - PROBLEM SELECTOR PLAN 3
- 124 on admission  - May be chronic, pt has been eating less that usual but still eating per granddaughter   - Appears to be mildly dehydrated   - urine studies pending  - In house nephro consulted

## 2023-09-09 NOTE — CONSULT NOTE ADULT - ASSESSMENT
87 y/o F with PMH of HTN and osteoporosis presenting with complaints of  abdominal pain, dark blood in stool, one episode of diarrhea and found to be hyponatremic with Na 124, for which nephrology service was consulted.  COVID+ two weeks ago  Recent UTI tx

## 2023-09-09 NOTE — H&P ADULT - NSHPREVIEWOFSYSTEMS_GEN_ALL_CORE
Review of Systems:   CONSTITUTIONAL: No fever, weight loss  EYES: No eye pain, visual disturbances, or discharge  ENMT:  No sinus or throat pain  RESPIRATORY: No SOB. No cough, wheezing, chills or hemoptysis  CARDIOVASCULAR: No chest pain, palpitations, dizziness, or leg swelling  GASTROINTESTINAL: No vomiting, or hematemesis; No diarrhea or constipation. No melena or hematochezia.  GENITOURINARY: + DYSURIA No frequency, hematuria, or incontinence  NEUROLOGICAL: No headaches, memory loss, loss of strength, numbness, or tremors  SKIN: No itching, burning, rashes, or lesions   LYMPH NODES: No enlarged glands  ENDOCRINE: No heat or cold intolerance; No hair loss  MUSCULOSKELETAL: No joint pain or swelling; No muscle, back pain  PSYCHIATRIC: No depression, anxiety, mood swings, or difficulty sleeping  HEME/LYMPH: No easy bruising, or bleeding gums

## 2023-09-09 NOTE — H&P ADULT - HISTORY OF PRESENT ILLNESS
89 y/o F with PMH of HTN and osteoporosis presenting with complaints of  abdominal pain. Pt states for the last four days, having epigastric pain, on saturday pt noticed in BM dark blood in stool, pt also had one episode of diarrhea on saturday. Pt was recently dx with UTI finished course of treatment is still having burning upon urination. Pt c/o epigastric pain, nausea, SOB and overweakness. As per daughter pt seeems a bit confused at times, normally A&Ox4 at baseline, pt has also decreased PO intake do to abdominal discomfort and nausea. Recently COVID + last week. Unsure when last colonoscopy was done.    Pt speaks Latvian, prefers granddaughter translation

## 2023-09-09 NOTE — PROVIDER CONTACT NOTE (OTHER) - ACTION/TREATMENT ORDERED:
As per H&P pt. had COVID 2 weeks ago and pt. is coming in for abd pain, weakness and not eating. Pt. is experiencing symptoms of burning urination and nausea.  Isolation discontinued as per policy.

## 2023-09-09 NOTE — H&P ADULT - NSHPLABSRESULTS_GEN_ALL_CORE
12.7   6.19  )-----------( 314      ( 08 Sep 2023 22:15 )             36.4       09-08    124<L>  |  89<L>  |  8   ----------------------------<  113<H>  3.7   |  22  |  0.68    Ca    9.2      08 Sep 2023 22:15    TPro  8.3  /  Alb  4.5  /  TBili  0.6  /  DBili  x   /  AST  32  /  ALT  19  /  AlkPhos  92  09-08      Troponin T, High Sensitivity Result: 14 ng/L (09-08-23 @ 22:15)

## 2023-09-09 NOTE — H&P ADULT - ASSESSMENT
89 y/o F with PMH of HTN and osteoporosis presenting with complaints of  abdominal pain. Pt states for the last four days, having epigastric pain, on saturday pt noticed in BM dark blood in stool, pt also had one episode of diarrhea on saturday. Pt was recently dx with UTI finished course of treatment is still having burning upon urination.

## 2023-09-10 LAB
ANION GAP SERPL CALC-SCNC: 14 MMOL/L — SIGNIFICANT CHANGE UP (ref 5–17)
ANION GAP SERPL CALC-SCNC: 15 MMOL/L — SIGNIFICANT CHANGE UP (ref 5–17)
ANION GAP SERPL CALC-SCNC: 18 MMOL/L — HIGH (ref 5–17)
BUN SERPL-MCNC: 6 MG/DL — LOW (ref 7–23)
CALCIUM SERPL-MCNC: 8.6 MG/DL — SIGNIFICANT CHANGE UP (ref 8.4–10.5)
CALCIUM SERPL-MCNC: 8.6 MG/DL — SIGNIFICANT CHANGE UP (ref 8.4–10.5)
CALCIUM SERPL-MCNC: 8.9 MG/DL — SIGNIFICANT CHANGE UP (ref 8.4–10.5)
CHLORIDE SERPL-SCNC: 80 MMOL/L — LOW (ref 96–108)
CHLORIDE SERPL-SCNC: 86 MMOL/L — LOW (ref 96–108)
CHLORIDE SERPL-SCNC: 89 MMOL/L — LOW (ref 96–108)
CO2 SERPL-SCNC: 18 MMOL/L — LOW (ref 22–31)
CO2 SERPL-SCNC: 19 MMOL/L — LOW (ref 22–31)
CO2 SERPL-SCNC: 21 MMOL/L — LOW (ref 22–31)
CREAT SERPL-MCNC: 0.7 MG/DL — SIGNIFICANT CHANGE UP (ref 0.5–1.3)
CREAT SERPL-MCNC: 0.71 MG/DL — SIGNIFICANT CHANGE UP (ref 0.5–1.3)
CREAT SERPL-MCNC: 0.71 MG/DL — SIGNIFICANT CHANGE UP (ref 0.5–1.3)
EGFR: 82 ML/MIN/1.73M2 — SIGNIFICANT CHANGE UP
EGFR: 82 ML/MIN/1.73M2 — SIGNIFICANT CHANGE UP
EGFR: 83 ML/MIN/1.73M2 — SIGNIFICANT CHANGE UP
GLUCOSE SERPL-MCNC: 110 MG/DL — HIGH (ref 70–99)
GLUCOSE SERPL-MCNC: 137 MG/DL — HIGH (ref 70–99)
GLUCOSE SERPL-MCNC: 149 MG/DL — HIGH (ref 70–99)
HCT VFR BLD CALC: 32.8 % — LOW (ref 34.5–45)
HGB BLD-MCNC: 11.7 G/DL — SIGNIFICANT CHANGE UP (ref 11.5–15.5)
MAGNESIUM SERPL-MCNC: 1.8 MG/DL — SIGNIFICANT CHANGE UP (ref 1.6–2.6)
MCHC RBC-ENTMCNC: 29.9 PG — SIGNIFICANT CHANGE UP (ref 27–34)
MCHC RBC-ENTMCNC: 35.7 GM/DL — SIGNIFICANT CHANGE UP (ref 32–36)
MCV RBC AUTO: 83.9 FL — SIGNIFICANT CHANGE UP (ref 80–100)
NRBC # BLD: 0 /100 WBCS — SIGNIFICANT CHANGE UP (ref 0–0)
PHOSPHATE SERPL-MCNC: 3.1 MG/DL — SIGNIFICANT CHANGE UP (ref 2.5–4.5)
PLATELET # BLD AUTO: 292 K/UL — SIGNIFICANT CHANGE UP (ref 150–400)
POTASSIUM SERPL-MCNC: 3.5 MMOL/L — SIGNIFICANT CHANGE UP (ref 3.5–5.3)
POTASSIUM SERPL-MCNC: 3.6 MMOL/L — SIGNIFICANT CHANGE UP (ref 3.5–5.3)
POTASSIUM SERPL-MCNC: 3.8 MMOL/L — SIGNIFICANT CHANGE UP (ref 3.5–5.3)
POTASSIUM SERPL-SCNC: 3.5 MMOL/L — SIGNIFICANT CHANGE UP (ref 3.5–5.3)
POTASSIUM SERPL-SCNC: 3.6 MMOL/L — SIGNIFICANT CHANGE UP (ref 3.5–5.3)
POTASSIUM SERPL-SCNC: 3.8 MMOL/L — SIGNIFICANT CHANGE UP (ref 3.5–5.3)
RBC # BLD: 3.91 M/UL — SIGNIFICANT CHANGE UP (ref 3.8–5.2)
RBC # FLD: 12.5 % — SIGNIFICANT CHANGE UP (ref 10.3–14.5)
SODIUM SERPL-SCNC: 115 MMOL/L — CRITICAL LOW (ref 135–145)
SODIUM SERPL-SCNC: 120 MMOL/L — CRITICAL LOW (ref 135–145)
SODIUM SERPL-SCNC: 125 MMOL/L — LOW (ref 135–145)
WBC # BLD: 6.42 K/UL — SIGNIFICANT CHANGE UP (ref 3.8–10.5)
WBC # FLD AUTO: 6.42 K/UL — SIGNIFICANT CHANGE UP (ref 3.8–10.5)

## 2023-09-10 PROCEDURE — 99232 SBSQ HOSP IP/OBS MODERATE 35: CPT

## 2023-09-10 PROCEDURE — 99233 SBSQ HOSP IP/OBS HIGH 50: CPT | Mod: GC

## 2023-09-10 RX ORDER — SODIUM CHLORIDE 9 MG/ML
2 INJECTION INTRAMUSCULAR; INTRAVENOUS; SUBCUTANEOUS THREE TIMES A DAY
Refills: 0 | Status: DISCONTINUED | OUTPATIENT
Start: 2023-09-10 | End: 2023-09-10

## 2023-09-10 RX ORDER — SODIUM CHLORIDE 5 G/100ML
250 INJECTION, SOLUTION INTRAVENOUS
Refills: 0 | Status: DISCONTINUED | OUTPATIENT
Start: 2023-09-10 | End: 2023-09-11

## 2023-09-10 RX ORDER — SODIUM CHLORIDE 9 MG/ML
1 INJECTION INTRAMUSCULAR; INTRAVENOUS; SUBCUTANEOUS THREE TIMES A DAY
Refills: 0 | Status: DISCONTINUED | OUTPATIENT
Start: 2023-09-10 | End: 2023-09-10

## 2023-09-10 RX ADMIN — Medication 25 MICROGRAM(S): at 05:49

## 2023-09-10 RX ADMIN — NEBIVOLOL HYDROCHLORIDE 5 MILLIGRAM(S): 5 TABLET ORAL at 05:50

## 2023-09-10 RX ADMIN — SODIUM CHLORIDE 50 MILLILITER(S): 5 INJECTION, SOLUTION INTRAVENOUS at 23:43

## 2023-09-10 RX ADMIN — TRAMADOL HYDROCHLORIDE 25 MILLIGRAM(S): 50 TABLET ORAL at 06:49

## 2023-09-10 RX ADMIN — PANTOPRAZOLE SODIUM 40 MILLIGRAM(S): 20 TABLET, DELAYED RELEASE ORAL at 05:50

## 2023-09-10 RX ADMIN — ENOXAPARIN SODIUM 40 MILLIGRAM(S): 100 INJECTION SUBCUTANEOUS at 21:32

## 2023-09-10 RX ADMIN — TRAMADOL HYDROCHLORIDE 25 MILLIGRAM(S): 50 TABLET ORAL at 05:49

## 2023-09-10 RX ADMIN — TRAMADOL HYDROCHLORIDE 25 MILLIGRAM(S): 50 TABLET ORAL at 12:20

## 2023-09-10 RX ADMIN — TRAMADOL HYDROCHLORIDE 25 MILLIGRAM(S): 50 TABLET ORAL at 11:24

## 2023-09-10 RX ADMIN — SODIUM CHLORIDE 1 GRAM(S): 9 INJECTION INTRAMUSCULAR; INTRAVENOUS; SUBCUTANEOUS at 05:50

## 2023-09-10 RX ADMIN — SODIUM CHLORIDE 1 GRAM(S): 9 INJECTION INTRAMUSCULAR; INTRAVENOUS; SUBCUTANEOUS at 13:38

## 2023-09-10 RX ADMIN — Medication 1 TABLET(S): at 05:51

## 2023-09-10 RX ADMIN — Medication 1 TABLET(S): at 17:41

## 2023-09-10 RX ADMIN — ONDANSETRON 4 MILLIGRAM(S): 8 TABLET, FILM COATED ORAL at 17:40

## 2023-09-10 NOTE — PROGRESS NOTE ADULT - PROBLEM SELECTOR PLAN 1
- CT A/P No acute intra-abdominal or pelvic pathology.  - Hgb stable, trend   - Stool culture, o/p pending   - Recently COVID +  - Will continue to monitor for symptoms, now has soft stools but had diarrhea with blood - no BM since admission per granddaughter   - Unclear when last colonoscopy was   - Consult GI if worsening, changes in Hgb headache/tremors/paresthesias

## 2023-09-10 NOTE — PHYSICAL THERAPY INITIAL EVALUATION ADULT - PERTINENT HX OF CURRENT PROBLEM, REHAB EVAL
88yoF PMHx HTN, hypothyroidism p/w 1 week of abd pain.  Patient and daughter report that she was admitted to the hospital about a week ago for a UTI discharged with antibiotics and was feeling improved. Today she presents with abdominal pain and concern for lightheadedness and presyncope.  She had a fever 2 days ago and tested positive for COVID about 2 weeks ago.  She has had ongoing symptoms of burning with urination and nausea but no vomiting.  She also noticed some blood in her stool couple of days ago.

## 2023-09-10 NOTE — PHYSICAL THERAPY INITIAL EVALUATION ADULT - ADDITIONAL COMMENTS
As per the grand dtr who speaks english, pt lives with her in an apt. 4 CINDY, PTA, stated she was I with mobility and ADls. uses cane for mobility. Family assists her as needed. As per the grand dtr who speaks english, pt lives with her in an apt, other family lives on the 2nd floot,. 4 CINDY, PTA, stated she was I with mobility and ADls. uses cane for mobility. Family assists her as needed.

## 2023-09-10 NOTE — PROGRESS NOTE ADULT - PROBLEM SELECTOR PLAN 3
- 124 on admission, improving slowly  - May be chronic, pt has been eating less that usual but still eating per granddaughter   - Appears to be mildly dehydrated   - urine studies pending  - In house nephro consulted - c/w salt tabs - 124 on admission, now 120, no symptoms, will touch base with renal  - May be chronic, pt has been eating less that usual but still eating per granddaughter   - Appears to be mildly dehydrated   - urine studies pending  - In house nephro consulted - c/w salt tabs

## 2023-09-10 NOTE — PHYSICAL THERAPY INITIAL EVALUATION ADULT - NSPTDISCHREC_GEN_A_CORE
d/c home w/ assist of family. Rec OPT to improve overall strength, mobility, pain management in B/L knees and back. Rec RW (hanane height) for ambulation d/t unsteady gait, decreased balance and strength./Outpatient PT

## 2023-09-10 NOTE — PROGRESS NOTE ADULT - PROBLEM SELECTOR PLAN 1
Urine studies reviewed. 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     Plan:  Increased salt tabs to 2gm TID  Na dropped to 115 overnight so will order 2% HTS 50cc/hr x 4 hours  BMP q4h  Will follow.    Thank you for involving us in the care of this patient.  Please call with any additional questions.  Carlie Skinner,   Nephrology Fellow  Contact me directly on TEAMS  (After 5pm or on weekends, please page the on-call fellow). Urine studies reviewed. 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     Plan:  Increased salt tabs to 2gm TID in am   Now with Na dropped to 115 overnight so will order 2% HTS 50cc/hr x 4 hours. Can hold salt tabs while getting HTS  BMP q4h  Will follow.    Thank you for involving us in the care of this patient.  Please call with any additional questions.  Carlie Skinner,   Nephrology Fellow  Contact me directly on TEAMS  (After 5pm or on weekends, please page the on-call fellow).

## 2023-09-11 LAB
ANION GAP SERPL CALC-SCNC: 12 MMOL/L — SIGNIFICANT CHANGE UP (ref 5–17)
ANION GAP SERPL CALC-SCNC: 12 MMOL/L — SIGNIFICANT CHANGE UP (ref 5–17)
ANION GAP SERPL CALC-SCNC: 14 MMOL/L — SIGNIFICANT CHANGE UP (ref 5–17)
BUN SERPL-MCNC: 12 MG/DL — SIGNIFICANT CHANGE UP (ref 7–23)
BUN SERPL-MCNC: 6 MG/DL — LOW (ref 7–23)
BUN SERPL-MCNC: 7 MG/DL — SIGNIFICANT CHANGE UP (ref 7–23)
CALCIUM SERPL-MCNC: 8.2 MG/DL — LOW (ref 8.4–10.5)
CALCIUM SERPL-MCNC: 8.2 MG/DL — LOW (ref 8.4–10.5)
CALCIUM SERPL-MCNC: 8.5 MG/DL — SIGNIFICANT CHANGE UP (ref 8.4–10.5)
CHLORIDE SERPL-SCNC: 84 MMOL/L — LOW (ref 96–108)
CHLORIDE SERPL-SCNC: 86 MMOL/L — LOW (ref 96–108)
CHLORIDE SERPL-SCNC: 92 MMOL/L — LOW (ref 96–108)
CO2 SERPL-SCNC: 16 MMOL/L — LOW (ref 22–31)
CO2 SERPL-SCNC: 17 MMOL/L — LOW (ref 22–31)
CO2 SERPL-SCNC: 19 MMOL/L — LOW (ref 22–31)
CREAT SERPL-MCNC: 0.74 MG/DL — SIGNIFICANT CHANGE UP (ref 0.5–1.3)
CREAT SERPL-MCNC: 0.78 MG/DL — SIGNIFICANT CHANGE UP (ref 0.5–1.3)
CREAT SERPL-MCNC: 1.27 MG/DL — SIGNIFICANT CHANGE UP (ref 0.5–1.3)
CULTURE RESULTS: SIGNIFICANT CHANGE UP
EGFR: 41 ML/MIN/1.73M2 — LOW
EGFR: 73 ML/MIN/1.73M2 — SIGNIFICANT CHANGE UP
EGFR: 78 ML/MIN/1.73M2 — SIGNIFICANT CHANGE UP
GLUCOSE SERPL-MCNC: 103 MG/DL — HIGH (ref 70–99)
GLUCOSE SERPL-MCNC: 109 MG/DL — HIGH (ref 70–99)
GLUCOSE SERPL-MCNC: 115 MG/DL — HIGH (ref 70–99)
HCT VFR BLD CALC: 30.8 % — LOW (ref 34.5–45)
HGB BLD-MCNC: 11.1 G/DL — LOW (ref 11.5–15.5)
MCHC RBC-ENTMCNC: 30.1 PG — SIGNIFICANT CHANGE UP (ref 27–34)
MCHC RBC-ENTMCNC: 36 GM/DL — SIGNIFICANT CHANGE UP (ref 32–36)
MCV RBC AUTO: 83.5 FL — SIGNIFICANT CHANGE UP (ref 80–100)
NRBC # BLD: 0 /100 WBCS — SIGNIFICANT CHANGE UP (ref 0–0)
OSMOLALITY UR: 299 MOS/KG — LOW (ref 300–900)
PLATELET # BLD AUTO: 316 K/UL — SIGNIFICANT CHANGE UP (ref 150–400)
POTASSIUM SERPL-MCNC: 3.5 MMOL/L — SIGNIFICANT CHANGE UP (ref 3.5–5.3)
POTASSIUM SERPL-MCNC: 4.2 MMOL/L — SIGNIFICANT CHANGE UP (ref 3.5–5.3)
POTASSIUM SERPL-MCNC: 4.4 MMOL/L — SIGNIFICANT CHANGE UP (ref 3.5–5.3)
POTASSIUM SERPL-SCNC: 3.5 MMOL/L — SIGNIFICANT CHANGE UP (ref 3.5–5.3)
POTASSIUM SERPL-SCNC: 4.2 MMOL/L — SIGNIFICANT CHANGE UP (ref 3.5–5.3)
POTASSIUM SERPL-SCNC: 4.4 MMOL/L — SIGNIFICANT CHANGE UP (ref 3.5–5.3)
RBC # BLD: 3.69 M/UL — LOW (ref 3.8–5.2)
RBC # FLD: 12.3 % — SIGNIFICANT CHANGE UP (ref 10.3–14.5)
SODIUM SERPL-SCNC: 115 MMOL/L — CRITICAL LOW (ref 135–145)
SODIUM SERPL-SCNC: 116 MMOL/L — CRITICAL LOW (ref 135–145)
SODIUM SERPL-SCNC: 121 MMOL/L — LOW (ref 135–145)
SPECIMEN SOURCE: SIGNIFICANT CHANGE UP
WBC # BLD: 7.07 K/UL — SIGNIFICANT CHANGE UP (ref 3.8–10.5)
WBC # FLD AUTO: 7.07 K/UL — SIGNIFICANT CHANGE UP (ref 3.8–10.5)

## 2023-09-11 PROCEDURE — 99233 SBSQ HOSP IP/OBS HIGH 50: CPT | Mod: GC

## 2023-09-11 PROCEDURE — 99233 SBSQ HOSP IP/OBS HIGH 50: CPT

## 2023-09-11 RX ORDER — SODIUM CHLORIDE 5 G/100ML
100 INJECTION, SOLUTION INTRAVENOUS
Refills: 0 | Status: DISCONTINUED | OUTPATIENT
Start: 2023-09-11 | End: 2023-09-11

## 2023-09-11 RX ORDER — SODIUM CHLORIDE 5 G/100ML
120 INJECTION, SOLUTION INTRAVENOUS
Refills: 0 | Status: DISCONTINUED | OUTPATIENT
Start: 2023-09-11 | End: 2023-09-12

## 2023-09-11 RX ORDER — SODIUM CHLORIDE 5 G/100ML
120 INJECTION, SOLUTION INTRAVENOUS
Refills: 0 | Status: DISCONTINUED | OUTPATIENT
Start: 2023-09-11 | End: 2023-09-11

## 2023-09-11 RX ORDER — SENNA PLUS 8.6 MG/1
2 TABLET ORAL AT BEDTIME
Refills: 0 | Status: DISCONTINUED | OUTPATIENT
Start: 2023-09-11 | End: 2023-09-15

## 2023-09-11 RX ORDER — POLYETHYLENE GLYCOL 3350 17 G/17G
17 POWDER, FOR SOLUTION ORAL DAILY
Refills: 0 | Status: DISCONTINUED | OUTPATIENT
Start: 2023-09-11 | End: 2023-09-15

## 2023-09-11 RX ORDER — SODIUM CHLORIDE 5 G/100ML
100 INJECTION, SOLUTION INTRAVENOUS
Refills: 0 | Status: COMPLETED | OUTPATIENT
Start: 2023-09-11 | End: 2023-09-11

## 2023-09-11 RX ADMIN — TRAMADOL HYDROCHLORIDE 25 MILLIGRAM(S): 50 TABLET ORAL at 18:27

## 2023-09-11 RX ADMIN — Medication 1 TABLET(S): at 05:31

## 2023-09-11 RX ADMIN — PANTOPRAZOLE SODIUM 40 MILLIGRAM(S): 20 TABLET, DELAYED RELEASE ORAL at 05:31

## 2023-09-11 RX ADMIN — ENOXAPARIN SODIUM 40 MILLIGRAM(S): 100 INJECTION SUBCUTANEOUS at 21:14

## 2023-09-11 RX ADMIN — Medication 1 TABLET(S): at 18:28

## 2023-09-11 RX ADMIN — TRAMADOL HYDROCHLORIDE 25 MILLIGRAM(S): 50 TABLET ORAL at 19:00

## 2023-09-11 RX ADMIN — Medication 25 MICROGRAM(S): at 05:31

## 2023-09-11 RX ADMIN — SODIUM CHLORIDE 200 MILLILITER(S): 5 INJECTION, SOLUTION INTRAVENOUS at 10:15

## 2023-09-11 RX ADMIN — SODIUM CHLORIDE 30 MILLILITER(S): 5 INJECTION, SOLUTION INTRAVENOUS at 12:22

## 2023-09-11 NOTE — PROVIDER CONTACT NOTE (CRITICAL VALUE NOTIFICATION) - ACTION/TREATMENT ORDERED:
Provider will follow up w team/renal. Pending further orders
Provider will follow up w team. Pending further orders

## 2023-09-11 NOTE — PROGRESS NOTE ADULT - TIME BILLING
- Ordering, reviewing, and interpreting labs, testing, and imaging  - Independently obtaining a review of systems and performing a physical exam  - Reviewing consultant documentation/recommendations in addition to discussing plan of care with consultants  - Counselling and educating patient and family regarding interpretation of aforementioned items and plan of care

## 2023-09-11 NOTE — PROGRESS NOTE ADULT - PROBLEM SELECTOR PLAN 1
- CT A/P No acute intra-abdominal or pelvic pathology.  - Hgb stable, trend   - Stool culture, o/p pending   - Recently COVID +  - Will continue to monitor for symptoms, now has soft stools but had diarrhea with blood - no BM since admission per granddaughter   - Unclear when last colonoscopy was   - check bladder scan to r/o retention

## 2023-09-11 NOTE — PROGRESS NOTE ADULT - PROBLEM SELECTOR PLAN 1
Urine studies reviewed. 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     Plan:  Now with Na dropped to 115 overnight and did not improve with 2% HTS 50cc/hr x 4 hours. Can hold salt tabs while getting HTS  Uosm 299  For today, give 100cc bolus of 2% HTS and then 30cc/hr of 3% HTS x 4 hours  BMP q4h  Will follow.    Thank you for involving us in the care of this patient.  Please call with any additional questions.  Carlie Skinner, DO  Nephrology Fellow  Contact me directly on TEAMS  (After 5pm or on weekends, please page the on-call fellow).

## 2023-09-11 NOTE — PROGRESS NOTE ADULT - PROBLEM SELECTOR PLAN 3
- 124 on admission, now downtrending. May have chronic, pt has been eating less that usual but still eating per granddaughter   - s/p 2% NS on 9/11 with Na from 115-->116  - salt tabs on hold while receiving hypertonic saline  - f/u nephrology recs

## 2023-09-11 NOTE — CHART NOTE - NSCHARTNOTEFT_GEN_A_CORE
MEDICINE NP- EPISODIC NOTE    Overnight events: D/W renal, Na 115>>115 s/p NS 2% 50cc/hr x 4 hrs, renal will follow up. Patient VSS, asymptomatic.     [id number]

## 2023-09-12 DIAGNOSIS — N17.9 ACUTE KIDNEY FAILURE, UNSPECIFIED: ICD-10-CM

## 2023-09-12 LAB
ANION GAP SERPL CALC-SCNC: 10 MMOL/L — SIGNIFICANT CHANGE UP (ref 5–17)
ANION GAP SERPL CALC-SCNC: 14 MMOL/L — SIGNIFICANT CHANGE UP (ref 5–17)
ANION GAP SERPL CALC-SCNC: 14 MMOL/L — SIGNIFICANT CHANGE UP (ref 5–17)
BUN SERPL-MCNC: 16 MG/DL — SIGNIFICANT CHANGE UP (ref 7–23)
BUN SERPL-MCNC: 16 MG/DL — SIGNIFICANT CHANGE UP (ref 7–23)
BUN SERPL-MCNC: 19 MG/DL — SIGNIFICANT CHANGE UP (ref 7–23)
CALCIUM SERPL-MCNC: 8.5 MG/DL — SIGNIFICANT CHANGE UP (ref 8.4–10.5)
CALCIUM SERPL-MCNC: 8.6 MG/DL — SIGNIFICANT CHANGE UP (ref 8.4–10.5)
CALCIUM SERPL-MCNC: 9.2 MG/DL — SIGNIFICANT CHANGE UP (ref 8.4–10.5)
CHLORIDE SERPL-SCNC: 90 MMOL/L — LOW (ref 96–108)
CHLORIDE SERPL-SCNC: 91 MMOL/L — LOW (ref 96–108)
CHLORIDE SERPL-SCNC: 93 MMOL/L — LOW (ref 96–108)
CO2 SERPL-SCNC: 17 MMOL/L — LOW (ref 22–31)
CO2 SERPL-SCNC: 17 MMOL/L — LOW (ref 22–31)
CO2 SERPL-SCNC: 20 MMOL/L — LOW (ref 22–31)
CREAT SERPL-MCNC: 1.25 MG/DL — SIGNIFICANT CHANGE UP (ref 0.5–1.3)
CREAT SERPL-MCNC: 1.33 MG/DL — HIGH (ref 0.5–1.3)
CREAT SERPL-MCNC: 1.35 MG/DL — HIGH (ref 0.5–1.3)
EGFR: 38 ML/MIN/1.73M2 — LOW
EGFR: 38 ML/MIN/1.73M2 — LOW
EGFR: 41 ML/MIN/1.73M2 — LOW
GLUCOSE SERPL-MCNC: 94 MG/DL — SIGNIFICANT CHANGE UP (ref 70–99)
GLUCOSE SERPL-MCNC: 95 MG/DL — SIGNIFICANT CHANGE UP (ref 70–99)
GLUCOSE SERPL-MCNC: 95 MG/DL — SIGNIFICANT CHANGE UP (ref 70–99)
OSMOLALITY UR: 410 MOS/KG — SIGNIFICANT CHANGE UP (ref 300–900)
POTASSIUM SERPL-MCNC: 4.3 MMOL/L — SIGNIFICANT CHANGE UP (ref 3.5–5.3)
POTASSIUM SERPL-MCNC: 4.3 MMOL/L — SIGNIFICANT CHANGE UP (ref 3.5–5.3)
POTASSIUM SERPL-MCNC: 4.4 MMOL/L — SIGNIFICANT CHANGE UP (ref 3.5–5.3)
POTASSIUM SERPL-SCNC: 4.3 MMOL/L — SIGNIFICANT CHANGE UP (ref 3.5–5.3)
POTASSIUM SERPL-SCNC: 4.3 MMOL/L — SIGNIFICANT CHANGE UP (ref 3.5–5.3)
POTASSIUM SERPL-SCNC: 4.4 MMOL/L — SIGNIFICANT CHANGE UP (ref 3.5–5.3)
POTASSIUM UR-SCNC: 38 MMOL/L — SIGNIFICANT CHANGE UP
SODIUM SERPL-SCNC: 121 MMOL/L — LOW (ref 135–145)
SODIUM SERPL-SCNC: 121 MMOL/L — LOW (ref 135–145)
SODIUM SERPL-SCNC: 124 MMOL/L — LOW (ref 135–145)
SODIUM UR-SCNC: 97 MMOL/L — SIGNIFICANT CHANGE UP

## 2023-09-12 PROCEDURE — 99232 SBSQ HOSP IP/OBS MODERATE 35: CPT

## 2023-09-12 PROCEDURE — 99232 SBSQ HOSP IP/OBS MODERATE 35: CPT | Mod: GC

## 2023-09-12 RX ORDER — SODIUM CHLORIDE 5 G/100ML
120 INJECTION, SOLUTION INTRAVENOUS
Refills: 0 | Status: DISCONTINUED | OUTPATIENT
Start: 2023-09-12 | End: 2023-09-14

## 2023-09-12 RX ORDER — LACTULOSE 10 G/15ML
10 SOLUTION ORAL ONCE
Refills: 0 | Status: COMPLETED | OUTPATIENT
Start: 2023-09-12 | End: 2023-09-12

## 2023-09-12 RX ADMIN — Medication 25 MICROGRAM(S): at 05:39

## 2023-09-12 RX ADMIN — TRAMADOL HYDROCHLORIDE 25 MILLIGRAM(S): 50 TABLET ORAL at 11:08

## 2023-09-12 RX ADMIN — Medication 1 TABLET(S): at 05:38

## 2023-09-12 RX ADMIN — LACTULOSE 10 GRAM(S): 10 SOLUTION ORAL at 11:07

## 2023-09-12 RX ADMIN — TRAMADOL HYDROCHLORIDE 25 MILLIGRAM(S): 50 TABLET ORAL at 11:38

## 2023-09-12 RX ADMIN — SENNA PLUS 2 TABLET(S): 8.6 TABLET ORAL at 21:11

## 2023-09-12 RX ADMIN — POLYETHYLENE GLYCOL 3350 17 GRAM(S): 17 POWDER, FOR SOLUTION ORAL at 14:45

## 2023-09-12 RX ADMIN — NEBIVOLOL HYDROCHLORIDE 5 MILLIGRAM(S): 5 TABLET ORAL at 05:38

## 2023-09-12 RX ADMIN — SODIUM CHLORIDE 30 MILLILITER(S): 5 INJECTION, SOLUTION INTRAVENOUS at 14:47

## 2023-09-12 RX ADMIN — PANTOPRAZOLE SODIUM 40 MILLIGRAM(S): 20 TABLET, DELAYED RELEASE ORAL at 05:39

## 2023-09-12 RX ADMIN — ENOXAPARIN SODIUM 40 MILLIGRAM(S): 100 INJECTION SUBCUTANEOUS at 21:11

## 2023-09-12 NOTE — PROGRESS NOTE ADULT - PROBLEM SELECTOR PLAN 3
- 124 on admission, then downtrended. May have chronic, pt has been eating less that usual but still eating per granddaughter   - Na 121 this morning, plan for hypertonic saline 3%  - renal recs greatly appreciated  - salt tabs on hold while receiving hypertonic saline  - check bmp q6

## 2023-09-12 NOTE — PROVIDER CONTACT NOTE (OTHER) - ACTION/TREATMENT ORDERED:
MD aware, MD said HR and BP are within range. MD to come to bedside to talk to pt. Otherwise no new orders besides q6 BMP blood draws. Plan of care continues. MD aware, MD said HR and BP are within range. MD to come to bedside to talk to pt. Otherwise no new orders besides q8 BMP blood draws. Plan of care continues.

## 2023-09-12 NOTE — PROGRESS NOTE ADULT - PROBLEM SELECTOR PLAN 1
- CT A/P No acute intra-abdominal or pelvic pathology.  - Hgb stable, trend   - Stool culture   - Recently COVID +  - Will continue to monitor for symptoms, now has soft stools but had diarrhea with blood - no BM since admission per granddaughter   - Unclear when last colonoscopy was   - check bladder scan to r/o retention

## 2023-09-12 NOTE — PROGRESS NOTE ADULT - PROBLEM SELECTOR PLAN 1
Urine studies reviewed. 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     Plan:  Uosm 203->299->410  Improved to 121 after 100cc bolus of 2% HTS and then 30cc/hr of 3% HTS x 4 hours (9/11)  Given another 30cc/hr of 3% HTS x 4 hours (9/12)  Maintain strict fluid restriction  BMP q8h    Thank you for involving us in the care of this patient. We will continue to follow.  Carlie Skinner,   Nephrology Fellow  Feel free to contact me directly on TEAMS with any additional questions.  (After 5pm or on weekends, please call the on-call fellow).

## 2023-09-13 LAB
ANION GAP SERPL CALC-SCNC: 13 MMOL/L — SIGNIFICANT CHANGE UP (ref 5–17)
ANION GAP SERPL CALC-SCNC: 13 MMOL/L — SIGNIFICANT CHANGE UP (ref 5–17)
ANION GAP SERPL CALC-SCNC: 14 MMOL/L — SIGNIFICANT CHANGE UP (ref 5–17)
BUN SERPL-MCNC: 22 MG/DL — SIGNIFICANT CHANGE UP (ref 7–23)
BUN SERPL-MCNC: 23 MG/DL — SIGNIFICANT CHANGE UP (ref 7–23)
BUN SERPL-MCNC: 24 MG/DL — HIGH (ref 7–23)
CALCIUM SERPL-MCNC: 9.3 MG/DL — SIGNIFICANT CHANGE UP (ref 8.4–10.5)
CALCIUM SERPL-MCNC: 9.3 MG/DL — SIGNIFICANT CHANGE UP (ref 8.4–10.5)
CALCIUM SERPL-MCNC: 9.8 MG/DL — SIGNIFICANT CHANGE UP (ref 8.4–10.5)
CHLORIDE SERPL-SCNC: 92 MMOL/L — LOW (ref 96–108)
CHLORIDE SERPL-SCNC: 94 MMOL/L — LOW (ref 96–108)
CHLORIDE SERPL-SCNC: 96 MMOL/L — SIGNIFICANT CHANGE UP (ref 96–108)
CO2 SERPL-SCNC: 18 MMOL/L — LOW (ref 22–31)
CO2 SERPL-SCNC: 19 MMOL/L — LOW (ref 22–31)
CO2 SERPL-SCNC: 19 MMOL/L — LOW (ref 22–31)
CREAT SERPL-MCNC: 1.19 MG/DL — SIGNIFICANT CHANGE UP (ref 0.5–1.3)
CREAT SERPL-MCNC: 1.21 MG/DL — SIGNIFICANT CHANGE UP (ref 0.5–1.3)
CREAT SERPL-MCNC: 1.43 MG/DL — HIGH (ref 0.5–1.3)
EGFR: 35 ML/MIN/1.73M2 — LOW
EGFR: 43 ML/MIN/1.73M2 — LOW
EGFR: 44 ML/MIN/1.73M2 — LOW
GLUCOSE SERPL-MCNC: 101 MG/DL — HIGH (ref 70–99)
GLUCOSE SERPL-MCNC: 108 MG/DL — HIGH (ref 70–99)
GLUCOSE SERPL-MCNC: 98 MG/DL — SIGNIFICANT CHANGE UP (ref 70–99)
HCT VFR BLD CALC: 31.9 % — LOW (ref 34.5–45)
HGB BLD-MCNC: 11 G/DL — LOW (ref 11.5–15.5)
MAGNESIUM SERPL-MCNC: 2 MG/DL — SIGNIFICANT CHANGE UP (ref 1.6–2.6)
MCHC RBC-ENTMCNC: 30.1 PG — SIGNIFICANT CHANGE UP (ref 27–34)
MCHC RBC-ENTMCNC: 34.5 GM/DL — SIGNIFICANT CHANGE UP (ref 32–36)
MCV RBC AUTO: 87.4 FL — SIGNIFICANT CHANGE UP (ref 80–100)
NRBC # BLD: 0 /100 WBCS — SIGNIFICANT CHANGE UP (ref 0–0)
PHOSPHATE SERPL-MCNC: 3.6 MG/DL — SIGNIFICANT CHANGE UP (ref 2.5–4.5)
PLATELET # BLD AUTO: 364 K/UL — SIGNIFICANT CHANGE UP (ref 150–400)
POTASSIUM SERPL-MCNC: 3.7 MMOL/L — SIGNIFICANT CHANGE UP (ref 3.5–5.3)
POTASSIUM SERPL-MCNC: 4.2 MMOL/L — SIGNIFICANT CHANGE UP (ref 3.5–5.3)
POTASSIUM SERPL-MCNC: 4.2 MMOL/L — SIGNIFICANT CHANGE UP (ref 3.5–5.3)
POTASSIUM SERPL-SCNC: 3.7 MMOL/L — SIGNIFICANT CHANGE UP (ref 3.5–5.3)
POTASSIUM SERPL-SCNC: 4.2 MMOL/L — SIGNIFICANT CHANGE UP (ref 3.5–5.3)
POTASSIUM SERPL-SCNC: 4.2 MMOL/L — SIGNIFICANT CHANGE UP (ref 3.5–5.3)
RBC # BLD: 3.65 M/UL — LOW (ref 3.8–5.2)
RBC # FLD: 13.3 % — SIGNIFICANT CHANGE UP (ref 10.3–14.5)
SODIUM SERPL-SCNC: 124 MMOL/L — LOW (ref 135–145)
SODIUM SERPL-SCNC: 126 MMOL/L — LOW (ref 135–145)
SODIUM SERPL-SCNC: 128 MMOL/L — LOW (ref 135–145)
WBC # BLD: 8.02 K/UL — SIGNIFICANT CHANGE UP (ref 3.8–10.5)
WBC # FLD AUTO: 8.02 K/UL — SIGNIFICANT CHANGE UP (ref 3.8–10.5)

## 2023-09-13 PROCEDURE — 99233 SBSQ HOSP IP/OBS HIGH 50: CPT | Mod: GC

## 2023-09-13 PROCEDURE — 99233 SBSQ HOSP IP/OBS HIGH 50: CPT

## 2023-09-13 PROCEDURE — 70450 CT HEAD/BRAIN W/O DYE: CPT | Mod: 26

## 2023-09-13 RX ORDER — SODIUM CHLORIDE 9 MG/ML
2 INJECTION INTRAMUSCULAR; INTRAVENOUS; SUBCUTANEOUS THREE TIMES A DAY
Refills: 0 | Status: DISCONTINUED | OUTPATIENT
Start: 2023-09-13 | End: 2023-09-14

## 2023-09-13 RX ADMIN — NEBIVOLOL HYDROCHLORIDE 5 MILLIGRAM(S): 5 TABLET ORAL at 10:25

## 2023-09-13 RX ADMIN — SODIUM CHLORIDE 2 GRAM(S): 9 INJECTION INTRAMUSCULAR; INTRAVENOUS; SUBCUTANEOUS at 14:50

## 2023-09-13 RX ADMIN — SODIUM CHLORIDE 2 GRAM(S): 9 INJECTION INTRAMUSCULAR; INTRAVENOUS; SUBCUTANEOUS at 21:31

## 2023-09-13 RX ADMIN — SENNA PLUS 2 TABLET(S): 8.6 TABLET ORAL at 21:31

## 2023-09-13 RX ADMIN — Medication 25 MICROGRAM(S): at 10:25

## 2023-09-13 RX ADMIN — POLYETHYLENE GLYCOL 3350 17 GRAM(S): 17 POWDER, FOR SOLUTION ORAL at 12:05

## 2023-09-13 RX ADMIN — ENOXAPARIN SODIUM 40 MILLIGRAM(S): 100 INJECTION SUBCUTANEOUS at 21:31

## 2023-09-13 RX ADMIN — PANTOPRAZOLE SODIUM 40 MILLIGRAM(S): 20 TABLET, DELAYED RELEASE ORAL at 10:25

## 2023-09-13 NOTE — PROVIDER CONTACT NOTE (OTHER) - BACKGROUND
Pt came in for abd pain and malaise
Pt requesting to take am meds with breakfast
Pt came in for malaise

## 2023-09-13 NOTE — PROVIDER CONTACT NOTE (OTHER) - ASSESSMENT
Pt Na came back as 121. VSS
Pt requesting to take am meds with breakfast
Pt /43 HR 51. Pt asymtomatic. all other vss

## 2023-09-13 NOTE — PROGRESS NOTE ADULT - ATTENDING COMMENTS
HYponatremia:   as above  Unclear why she has SIADH. No e/o lung pathology  Consider CT head  Will likely need to be dc on salt tabs/fluid restriction    BLESSING: Mild   Possibly due to use of bactrim   Monitor      Rest as per Dr. Brody Gunter MD  O: [phone]  Contact me on teams
Worsening hyponatremia despite fluid restriction, liited hypertonic saline.  Grandaughter at bedside.  Patient wants to go home.  Alert, conversant  1.  HYponatremia--SIADH type.  STRICT FLUID RESTRICTION < 750 CC/DAY.   Hypertonic saline bolus+ 3% infusion.  CAN repeat X 3 with checking serum Na q4-6h.  Limit of repetition guided by desired rise in Na not >7/24 hrs    discussed with med team  Gilberto Phillips MD  contact me on TEAMS
Rest as per Dr. Brody Gunter MD  O: [phone]  Contact me on teams
Rest as per Dr. Brody Gunter MD  O: [phone]  Contact me on teams

## 2023-09-13 NOTE — PROGRESS NOTE ADULT - PROBLEM SELECTOR PLAN 1
- CT A/P No acute intra-abdominal or pelvic pathology.  - Hgb stable, trend   - Stool culture   - Recently COVID +  - Unclear when last colonoscopy was  - laxatives, suppository as needed

## 2023-09-13 NOTE — PROGRESS NOTE ADULT - PROBLEM SELECTOR PLAN 3
- 124 on admission, then downtrended. May have chronic, pt has been eating less that usual but still eating per granddaughter   - Na 126 this morning  - renal recs greatly appreciated  - start salt tabs 2g TID  - check bmp q8  - check cortisol in am, CTH

## 2023-09-13 NOTE — PROGRESS NOTE ADULT - PROBLEM SELECTOR PLAN 1
Urine studies reviewed. 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     Plan:  Uosm 203->299->410  Improved to 121 after 100cc bolus of 2% HTS and then 30cc/hr of 3% HTS x 4 hours (9/11)  Given another 30cc/hr of 3% HTS x 4 hours (9/12)  Start NaCl tabs 2g TID (9/13). Patient to let us know if they make her nauseous.  Maintain strict fluid restriction  BMP q8h  Etiology of SIADH remains unclear: TSH wnl. check AM cortisol and CT head please

## 2023-09-13 NOTE — PROVIDER CONTACT NOTE (OTHER) - SITUATION
Pt /43 HR 51. Pt asymtomatic. Pt family also requesting to speak with provider
Pt Na came back as 121
Pt requesting to take am meds with breakfast

## 2023-09-14 ENCOUNTER — TRANSCRIPTION ENCOUNTER (OUTPATIENT)
Age: 88
End: 2023-09-14

## 2023-09-14 LAB
ANION GAP SERPL CALC-SCNC: 14 MMOL/L — SIGNIFICANT CHANGE UP (ref 5–17)
ANION GAP SERPL CALC-SCNC: 14 MMOL/L — SIGNIFICANT CHANGE UP (ref 5–17)
BUN SERPL-MCNC: 25 MG/DL — HIGH (ref 7–23)
BUN SERPL-MCNC: 26 MG/DL — HIGH (ref 7–23)
CALCIUM SERPL-MCNC: 8.9 MG/DL — SIGNIFICANT CHANGE UP (ref 8.4–10.5)
CALCIUM SERPL-MCNC: 8.9 MG/DL — SIGNIFICANT CHANGE UP (ref 8.4–10.5)
CHLORIDE SERPL-SCNC: 98 MMOL/L — SIGNIFICANT CHANGE UP (ref 96–108)
CHLORIDE SERPL-SCNC: 98 MMOL/L — SIGNIFICANT CHANGE UP (ref 96–108)
CO2 SERPL-SCNC: 17 MMOL/L — LOW (ref 22–31)
CO2 SERPL-SCNC: 18 MMOL/L — LOW (ref 22–31)
CORTIS AM PEAK SERPL-MCNC: 16.4 UG/DL — SIGNIFICANT CHANGE UP (ref 6–18.4)
CREAT SERPL-MCNC: 0.92 MG/DL — SIGNIFICANT CHANGE UP (ref 0.5–1.3)
CREAT SERPL-MCNC: 1.11 MG/DL — SIGNIFICANT CHANGE UP (ref 0.5–1.3)
CULTURE RESULTS: SIGNIFICANT CHANGE UP
CULTURE RESULTS: SIGNIFICANT CHANGE UP
CYSTATIN C SERPL-MCNC: 1.56 MG/L — SIGNIFICANT CHANGE UP
EGFR: 48 ML/MIN/1.73M2 — LOW
EGFR: 60 ML/MIN/1.73M2 — SIGNIFICANT CHANGE UP
GFR/BSA.PRED SERPLBLD CYS-BASED-ARV: 36 ML/MIN/1.73M2 — LOW
GLUCOSE SERPL-MCNC: 100 MG/DL — HIGH (ref 70–99)
GLUCOSE SERPL-MCNC: 98 MG/DL — SIGNIFICANT CHANGE UP (ref 70–99)
POTASSIUM SERPL-MCNC: 4 MMOL/L — SIGNIFICANT CHANGE UP (ref 3.5–5.3)
POTASSIUM SERPL-MCNC: 4.5 MMOL/L — SIGNIFICANT CHANGE UP (ref 3.5–5.3)
POTASSIUM SERPL-SCNC: 4 MMOL/L — SIGNIFICANT CHANGE UP (ref 3.5–5.3)
POTASSIUM SERPL-SCNC: 4.5 MMOL/L — SIGNIFICANT CHANGE UP (ref 3.5–5.3)
SODIUM SERPL-SCNC: 129 MMOL/L — LOW (ref 135–145)
SODIUM SERPL-SCNC: 130 MMOL/L — LOW (ref 135–145)
SPECIMEN SOURCE: SIGNIFICANT CHANGE UP
SPECIMEN SOURCE: SIGNIFICANT CHANGE UP
TSH SERPL-MCNC: 2.09 UIU/ML — SIGNIFICANT CHANGE UP (ref 0.27–4.2)
URATE SERPL-MCNC: 3.8 MG/DL — SIGNIFICANT CHANGE UP (ref 2.5–7)

## 2023-09-14 PROCEDURE — 99233 SBSQ HOSP IP/OBS HIGH 50: CPT

## 2023-09-14 PROCEDURE — 99232 SBSQ HOSP IP/OBS MODERATE 35: CPT

## 2023-09-14 RX ORDER — ACETAMINOPHEN 500 MG
2 TABLET ORAL
Qty: 0 | Refills: 0 | DISCHARGE
Start: 2023-09-14

## 2023-09-14 RX ORDER — SODIUM CHLORIDE 9 MG/ML
2 INJECTION INTRAMUSCULAR; INTRAVENOUS; SUBCUTANEOUS THREE TIMES A DAY
Refills: 0 | Status: DISCONTINUED | OUTPATIENT
Start: 2023-09-14 | End: 2023-09-15

## 2023-09-14 RX ORDER — POLYETHYLENE GLYCOL 3350 17 G/17G
17 POWDER, FOR SOLUTION ORAL
Qty: 0 | Refills: 0 | DISCHARGE
Start: 2023-09-14

## 2023-09-14 RX ORDER — SENNA PLUS 8.6 MG/1
2 TABLET ORAL
Qty: 0 | Refills: 0 | DISCHARGE
Start: 2023-09-14

## 2023-09-14 RX ADMIN — SODIUM CHLORIDE 2 GRAM(S): 9 INJECTION INTRAMUSCULAR; INTRAVENOUS; SUBCUTANEOUS at 05:23

## 2023-09-14 RX ADMIN — SODIUM CHLORIDE 2 GRAM(S): 9 INJECTION INTRAMUSCULAR; INTRAVENOUS; SUBCUTANEOUS at 13:21

## 2023-09-14 RX ADMIN — Medication 25 MICROGRAM(S): at 05:23

## 2023-09-14 RX ADMIN — ENOXAPARIN SODIUM 40 MILLIGRAM(S): 100 INJECTION SUBCUTANEOUS at 21:09

## 2023-09-14 RX ADMIN — PANTOPRAZOLE SODIUM 40 MILLIGRAM(S): 20 TABLET, DELAYED RELEASE ORAL at 05:23

## 2023-09-14 RX ADMIN — NEBIVOLOL HYDROCHLORIDE 5 MILLIGRAM(S): 5 TABLET ORAL at 06:38

## 2023-09-14 RX ADMIN — SODIUM CHLORIDE 2 GRAM(S): 9 INJECTION INTRAMUSCULAR; INTRAVENOUS; SUBCUTANEOUS at 18:11

## 2023-09-14 NOTE — DISCHARGE NOTE PROVIDER - CARE PROVIDER_API CALL
PCP,   Phone: (   )    -  Fax: (   )    -  Follow Up Time: 1 week   PCP,   Phone: (   )    -  Fax: (   )    -  Follow Up Time: 1 week    Darian Gunter  Nephrology  [address] [place] [address], [address] [place] [address]  Phone: [phone]  Fax: [phone]  Follow Up Time: 1 week

## 2023-09-14 NOTE — DISCHARGE NOTE PROVIDER - NSDCMRMEDTOKEN_GEN_ALL_CORE_FT
acetaminophen 325 mg oral tablet: 2 tab(s) orally every 6 hours As needed Temp greater or equal to 38C (100.4F), Mild Pain (1 - 3)  levothyroxine 25 mcg (0.025 mg) oral tablet: 1 tab(s) orally once a day PLEASE NOTE: ALL MEDS FILLED OUT OF COUNTRY  nebivolol 5 mg oral tablet: 1 tab(s) orally once a day PLEASE NOTE: ALL MEDS FILLED OUT OF COUNTRY  polyethylene glycol 3350 oral powder for reconstitution: 17 gram(s) orally once a day  senna leaf extract oral tablet: 2 tab(s) orally once a day (at bedtime)   acetaminophen 325 mg oral tablet: 2 tab(s) orally every 6 hours As needed Temp greater or equal to 38C (100.4F), Mild Pain (1 - 3)  levothyroxine 25 mcg (0.025 mg) oral tablet: 1 tab(s) orally once a day PLEASE NOTE: ALL MEDS FILLED OUT OF COUNTRY  nebivolol 5 mg oral tablet: 1 tab(s) orally once a day PLEASE NOTE: ALL MEDS FILLED OUT OF COUNTRY  pantoprazole 40 mg oral delayed release tablet: 1 tab(s) orally once a day (before a meal)  polyethylene glycol 3350 oral powder for reconstitution: 17 gram(s) orally once a day  senna leaf extract oral tablet: 2 tab(s) orally once a day (at bedtime)  sodium chloride 1 g oral tablet: 2 tab(s) orally 3 times a day

## 2023-09-14 NOTE — PROGRESS NOTE ADULT - PROBLEM SELECTOR PROBLEM 1
Hyponatremia
Hyponatremia
Abdominal pain
Hyponatremia
Hyponatremia
Abdominal pain
Hyponatremia
Abdominal pain
Abdominal pain
Hyponatremia

## 2023-09-14 NOTE — DISCHARGE NOTE NURSING/CASE MANAGEMENT/SOCIAL WORK - PATIENT PORTAL LINK FT
You can access the FollowMyHealth Patient Portal offered by [facility] by registering at the following website: [email or web address]. By joining [employer]’s FollowMyHealth portal, you will also be able to view your health information using other applications (apps) compatible with our system.

## 2023-09-14 NOTE — PROGRESS NOTE ADULT - PROBLEM SELECTOR PLAN 4
- c/w levothyroxine 25 mcg qd

## 2023-09-14 NOTE — DISCHARGE NOTE PROVIDER - NSDCCPCAREPLAN_GEN_ALL_CORE_FT
PRINCIPAL DISCHARGE DIAGNOSIS  Diagnosis: Abdominal pain  Assessment and Plan of Treatment: Work up and stool cultures were negative  Improved      SECONDARY DISCHARGE DIAGNOSES  Diagnosis: UTI (urinary tract infection), uncomplicated  Assessment and Plan of Treatment: HOME CARE INSTRUCTIONS  You completed a course of antibiotics  Drink enough water and fluids to keep your urine clear or pale yellow.  Avoid caffeine, tea, and carbonated beverages. They tend to irritate your bladder.  Empty your bladder often. Avoid holding urine for long periods of time.  Empty your bladder before and after sexual intercourse.  After a bowel movement, women should cleanse from front to back. Use each tissue only once.  SEEK MEDICAL CARE IF:  You have back pain.  You develop a fever.  Your symptoms do not begin to resolve within 3 days.  SEEK IMMEDIATE MEDICAL CARE IF:  You have severe back pain or lower abdominal pain.  You develop chills.  You have nausea or vomiting.  You have continued burning or discomfort with urination.    Diagnosis: Hyponatremia  Assessment and Plan of Treatment: HOME CARE INSTRUCTIONS  Only take medicines as directed by your caregiver. Many medicines can make hyponatremia worse. Discuss all your medicines with your caregiver.  Carefully follow any recommended diet, including any fluid restrictions.  You may be asked to repeat lab tests. Follow these directions.  Avoid alcohol and recreational drugs.  SEEK MEDICAL CARE IF:  You develop worsening nausea, fatigue, headache, confusion, or weakness.  Your original hyponatremia symptoms return.  You have problems following the recommended diet.   SEEK IMMEDIATE MEDICAL CARE IF:  You have a seizure.  You faint.  You have ongoing diarrhea or vomiting     PRINCIPAL DISCHARGE DIAGNOSIS  Diagnosis: Abdominal pain  Assessment and Plan of Treatment: Work up and stool cultures were negative  resolved      SECONDARY DISCHARGE DIAGNOSES  Diagnosis: BLESSING (acute kidney injury)  Assessment and Plan of Treatment: resolved    Diagnosis: UTI (urinary tract infection), uncomplicated  Assessment and Plan of Treatment: HOME CARE INSTRUCTIONS  You completed a course of antibiotics  Drink enough water and fluids to keep your urine clear or pale yellow.  Avoid caffeine, tea, and carbonated beverages. They tend to irritate your bladder.  Empty your bladder often. Avoid holding urine for long periods of time.  Empty your bladder before and after sexual intercourse.  After a bowel movement, women should cleanse from front to back. Use each tissue only once.  SEEK MEDICAL CARE IF:  You have back pain.  You develop a fever.  Your symptoms do not begin to resolve within 3 days.  SEEK IMMEDIATE MEDICAL CARE IF:  You have severe back pain or lower abdominal pain.  You develop chills.  You have nausea or vomiting.  You have continued burning or discomfort with urination.    Diagnosis: Hyponatremia  Assessment and Plan of Treatment: HOME CARE INSTRUCTIONS  Only take medicines as directed by your caregiver. Many medicines can make hyponatremia worse. Discuss all your medicines with your caregiver.  Carefully follow any recommended diet, including any fluid restrictions.  You may be asked to repeat lab tests. Follow these directions.  Avoid alcohol and recreational drugs.  SEEK MEDICAL CARE IF:  You develop worsening nausea, fatigue, headache, confusion, or weakness.  Your original hyponatremia symptoms return.  You have problems following the recommended diet.   SEEK IMMEDIATE MEDICAL CARE IF:  You have a seizure.  You faint.  You have ongoing diarrhea or vomiting  take salt tab as ordered, f/up with PCP/Nephrologist

## 2023-09-14 NOTE — PROGRESS NOTE ADULT - SUBJECTIVE AND OBJECTIVE BOX
[facility] DIVISION OF KIDNEY DISEASES AND HYPERTENSION   FOLLOW UP NOTE    --------------------------------------------------------------------------------    SUBJECTIVE / ROS / INTERVAL EVENTS: Sodium improving on HTS and strict fluid restriction, although patient frustrated with freq blood checks and not being able to go home. SHe is otherwise doing well.        PAST HISTORY  --------------------------------------------------------------------------------  No significant changes to PMH, PSH, FHx, SHx, unless otherwise noted    ALLERGIES & MEDICATIONS  --------------------------------------------------------------------------------  Allergies    No Known Allergies    Intolerances      Standing Inpatient Medications  enoxaparin Injectable 40 milliGRAM(s) SubCutaneous every 24 hours  influenza  Vaccine (HIGH DOSE) 0.7 milliLiter(s) IntraMuscular once  levothyroxine 25 MICROGram(s) Oral daily  nebivolol 5 milliGRAM(s) Oral daily  pantoprazole    Tablet 40 milliGRAM(s) Oral before breakfast  polyethylene glycol 3350 17 Gram(s) Oral daily  senna 2 Tablet(s) Oral at bedtime  sodium chloride 3%. 120 milliLiter(s) IV Continuous <Continuous>  traMADol 25 milliGRAM(s) Oral daily    PRN Inpatient Medications  acetaminophen     Tablet .. 650 milliGRAM(s) Oral every 6 hours PRN  aluminum hydroxide/magnesium hydroxide/simethicone Suspension 30 milliLiter(s) Oral every 4 hours PRN  melatonin 3 milliGRAM(s) Oral at bedtime PRN  ondansetron Injectable 4 milliGRAM(s) IV Push every 8 hours PRN      VITALS  --------------------------------------------------------------------------------  T(C): 37 (09-12-23 @ 09:13), Max: 37.2 (09-12-23 @ 00:50)  HR: 52 (09-12-23 @ 09:13) (52 - 62)  BP: 123/53 (09-12-23 @ 13:16) (106/50 - 123/53)  RR: 18 (09-12-23 @ 09:13) (18 - 18)  SpO2: 93% (09-12-23 @ 09:13) (93% - 97%)  Wt(kg): --      09-11-23 @ 07:01  -  09-12-23 @ 07:00  --------------------------------------------------------  IN: 400 mL / OUT: 0 mL / NET: 400 mL    09-12-23 @ 07:01  -  09-12-23 @ 18:34  --------------------------------------------------------  IN: 460 mL / OUT: 300 mL / NET: 160 mL      PHYSICAL EXAM:  General: no acute distress  Neuro: no focal deficits  HEENT: anicteric, no JVD  Pulmonary: lungs CTA B/L  Cardiovascular/Chest: +S1S2, RRR, no murmurs/rubs/gallops  GI/Abdomen: soft, non-distended, non-tender, +bowel sounds  Extremities: no bilateral LE edema  Skin: Warm and dry    LABS/STUDIES  --------------------------------------------------------------------------------              11.1   7.07  >-----------<  316      [09-11-23 @ 04:19]              30.8     121  |  90  |  16  ----------------------------<  94      [09-12-23 @ 09:23]  4.4   |  17  |  1.25        Ca     9.2     [09-12-23 @ 09:23]            Creatinine Trend:  SCr 1.25 [09-12 @ 09:23]  SCr 1.35 [09-12 @ 01:48]  SCr 1.27 [09-11 @ 19:42]  SCr 0.78 [09-11 @ 12:20]  SCr 0.74 [09-11 @ 04:19]    Urinalysis - [09-12-23 @ 09:23]      Color  / Appearance  / SG  / pH       Gluc 94 / Ketone   / Bili  / Urobili        Blood  / Protein  / Leuk Est  / Nitrite       RBC  / WBC  / Hyaline  / Gran  / Sq Epi  / Non Sq Epi  / Bacteria     Urine Creatinine 23      [09-09-23 @ 00:40]  Urine Protein 15      [09-09-23 @ 00:40]  Urine Sodium 97      [09-12-23 @ 11:34]  Urine Urea Nitrogen 154      [09-09-23 @ 00:40]  Urine Potassium 38      [09-12-23 @ 11:34]  Urine Osmolality 410      [09-12-23 @ 11:36]  
[facility] DIVISION OF KIDNEY DISEASES AND HYPERTENSION   FOLLOW UP NOTE    --------------------------------------------------------------------------------    SUBJECTIVE / ROS / INTERVAL EVENTS: Received 2% HTS overnights but Na continues to drop. She denies any complaints.        PAST HISTORY  --------------------------------------------------------------------------------  No significant changes to PMH, PSH, FHx, SHx, unless otherwise noted    ALLERGIES & MEDICATIONS  --------------------------------------------------------------------------------  Allergies    No Known Allergies    Intolerances      Standing Inpatient Medications  enoxaparin Injectable 40 milliGRAM(s) SubCutaneous every 24 hours  influenza  Vaccine (HIGH DOSE) 0.7 milliLiter(s) IntraMuscular once  levothyroxine 25 MICROGram(s) Oral daily  nebivolol 5 milliGRAM(s) Oral daily  pantoprazole    Tablet 40 milliGRAM(s) Oral before breakfast  polyethylene glycol 3350 17 Gram(s) Oral daily  senna 2 Tablet(s) Oral at bedtime  sodium chloride 3%. 120 milliLiter(s) IV Continuous <Continuous>  traMADol 25 milliGRAM(s) Oral daily  trimethoprim  160 mG/sulfamethoxazole 800 mG 1 Tablet(s) Oral two times a day    PRN Inpatient Medications  acetaminophen     Tablet .. 650 milliGRAM(s) Oral every 6 hours PRN  aluminum hydroxide/magnesium hydroxide/simethicone Suspension 30 milliLiter(s) Oral every 4 hours PRN  melatonin 3 milliGRAM(s) Oral at bedtime PRN  ondansetron Injectable 4 milliGRAM(s) IV Push every 8 hours PRN      VITALS/PHYSICAL EXAM  --------------------------------------------------------------------------------  T(C): 36.7 (09-11-23 @ 13:16), Max: 37.3 (09-10-23 @ 20:22)  HR: 89 (09-11-23 @ 13:16) (61 - 89)  BP: 134/66 (09-11-23 @ 13:16) (106/60 - 157/73)  RR: 18 (09-11-23 @ 13:16) (18 - 18)  SpO2: 98% (09-11-23 @ 13:16) (96% - 100%)  Wt(kg): --      09-10-23 @ 07:01  -  09-11-23 @ 07:00  --------------------------------------------------------  IN: 1050 mL / OUT: 980 mL / NET: 70 mL      General: NAD  Neuro: No focal deficits  Neck: No JVD  Pulmonary: Lungs CTA B/L  Cardiovascular/Chest: +S1S2, RRR  GI/Abdomen: Soft, non-distended, non-tender, +bowel sounds  Extremities: No LE pitting edema B/L  Skin: Warm and dry    LABS/STUDIES  --------------------------------------------------------------------------------              11.1   7.07  >-----------<  316      [09-11-23 @ 04:19]              30.8     116  |  86  |  7   ----------------------------<  103      [09-11-23 @ 12:20]  4.2   |  16  |  0.78        Ca     8.2     [09-11-23 @ 12:20]      Mg     1.8     [09-10-23 @ 03:43]      Phos  3.1     [09-10-23 @ 03:43]            Creatinine Trend:  SCr 0.78 [09-11 @ 12:20]  SCr 0.74 [09-11 @ 04:19]  SCr 0.71 [09-10 @ 19:10]  SCr 0.71 [09-10 @ 10:25]  SCr 0.70 [09-10 @ 03:43]    Urinalysis - [09-11-23 @ 12:20]      Color  / Appearance  / SG  / pH       Gluc 103 / Ketone   / Bili  / Urobili        Blood  / Protein  / Leuk Est  / Nitrite       RBC  / WBC  / Hyaline  / Gran  / Sq Epi  / Non Sq Epi  / Bacteria     Urine Creatinine 23      [09-09-23 @ 00:40]  Urine Protein 15      [09-09-23 @ 00:40]  Urine Sodium 67      [09-09-23 @ 00:40]  Urine Urea Nitrogen 154      [09-09-23 @ 00:40]  Urine Potassium 14      [09-09-23 @ 00:40]  Urine Osmolality 299      [09-11-23 @ 01:00]  
[facility] DIVISION OF KIDNEY DISEASES AND HYPERTENSION   FOLLOW UP NOTE    --------------------------------------------------------------------------------    SUBJECTIVE / ROS / INTERVAL EVENTS: seen and examined at bedside. asymptomatic. BP stable. Adequate U/O.        PAST HISTORY  --------------------------------------------------------------------------------  No significant changes to PMH, PSH, FHx, SHx, unless otherwise noted    ALLERGIES & MEDICATIONS  --------------------------------------------------------------------------------  Allergies    No Known Allergies    Intolerances      Standing Inpatient Medications  enoxaparin Injectable 40 milliGRAM(s) SubCutaneous every 24 hours  influenza  Vaccine (HIGH DOSE) 0.7 milliLiter(s) IntraMuscular once  levothyroxine 25 MICROGram(s) Oral daily  nebivolol 5 milliGRAM(s) Oral daily  pantoprazole    Tablet 40 milliGRAM(s) Oral before breakfast  sodium chloride 2 Gram(s) Oral three times a day  traMADol 25 milliGRAM(s) Oral daily  trimethoprim  160 mG/sulfamethoxazole 800 mG 1 Tablet(s) Oral two times a day    PRN Inpatient Medications  acetaminophen     Tablet .. 650 milliGRAM(s) Oral every 6 hours PRN  aluminum hydroxide/magnesium hydroxide/simethicone Suspension 30 milliLiter(s) Oral every 4 hours PRN  melatonin 3 milliGRAM(s) Oral at bedtime PRN  ondansetron Injectable 4 milliGRAM(s) IV Push every 8 hours PRN      VITALS/PHYSICAL EXAM  --------------------------------------------------------------------------------  T(C): 37.3 (09-10-23 @ 20:22), Max: 37.3 (09-10-23 @ 20:22)  HR: 62 (09-10-23 @ 20:22) (58 - 68)  BP: 157/73 (09-10-23 @ 20:22) (134/64 - 169/66)  RR: 18 (09-10-23 @ 20:22) (18 - 18)  SpO2: 100% (09-10-23 @ 20:22) (95% - 100%)  Wt(kg): --      09-09-23 @ 07:01  -  09-10-23 @ 07:00  --------------------------------------------------------  IN: 550 mL / OUT: 830 mL / NET: -280 mL    09-10-23 @ 07:01  -  09-10-23 @ 22:51  --------------------------------------------------------  IN: 450 mL / OUT: 500 mL / NET: -50 mL      General: NAD  Neuro: No focal deficits  Neck: no JVD  Pulmonary: Lungs CTA B/L  Cardiovascular/Chest: +S1S2, RRR  GI/Abdomen: Soft, non-distended, non-tender, +bowel sounds  Extremities: No LE pitting edema B/L  Skin: Warm and dry    LABS/STUDIES  --------------------------------------------------------------------------------              11.7   6.42  >-----------<  292      [09-10-23 @ 03:43]              32.8     115  |  80  |  6   ----------------------------<  149      [09-10-23 @ 19:10]  3.8   |  21  |  0.71        Ca     8.6     [09-10-23 @ 19:10]      Mg     1.8     [09-10-23 @ 03:43]      Phos  3.1     [09-10-23 @ 03:43]            Creatinine Trend:  SCr 0.71 [09-10 @ 19:10]  SCr 0.71 [09-10 @ 10:25]  SCr 0.70 [09-10 @ 03:43]  SCr 0.69 [09-09 @ 18:24]  SCr 0.68 [09-08 @ 22:15]    Urinalysis - [09-10-23 @ 19:10]      Color  / Appearance  / SG  / pH       Gluc 149 / Ketone   / Bili  / Urobili        Blood  / Protein  / Leuk Est  / Nitrite       RBC  / WBC  / Hyaline  / Gran  / Sq Epi  / Non Sq Epi  / Bacteria     Urine Creatinine 23      [09-09-23 @ 00:40]  Urine Protein 15      [09-09-23 @ 00:40]  Urine Sodium 67      [09-09-23 @ 00:40]  Urine Urea Nitrogen 154      [09-09-23 @ 00:40]  Urine Potassium 14      [09-09-23 @ 00:40]  Urine Osmolality 203      [09-09-23 @ 00:40]        
PROGRESS NOTE:   Authored by Dr. Naresh Maradiaga MD, Available on MS Teams    Patient is a 88y old  Female who presents with a chief complaint of abdominal pain (13 Sep 2023 12:27)      SUBJECTIVE / OVERNIGHT EVENTS: Translated by granddaughter at bedside. No chest pain or shortness of breath.    ADDITIONAL REVIEW OF SYSTEMS:    MEDICATIONS  (STANDING):  enoxaparin Injectable 40 milliGRAM(s) SubCutaneous every 24 hours  influenza  Vaccine (HIGH DOSE) 0.7 milliLiter(s) IntraMuscular once  levothyroxine 25 MICROGram(s) Oral daily  nebivolol 5 milliGRAM(s) Oral daily  pantoprazole    Tablet 40 milliGRAM(s) Oral before breakfast  polyethylene glycol 3350 17 Gram(s) Oral daily  senna 2 Tablet(s) Oral at bedtime  sodium chloride 2 Gram(s) Oral three times a day  sodium chloride 3%. 120 milliLiter(s) (30 mL/Hr) IV Continuous <Continuous>  traMADol 25 milliGRAM(s) Oral daily    MEDICATIONS  (PRN):  acetaminophen     Tablet .. 650 milliGRAM(s) Oral every 6 hours PRN Temp greater or equal to 38C (100.4F), Mild Pain (1 - 3)  aluminum hydroxide/magnesium hydroxide/simethicone Suspension 30 milliLiter(s) Oral every 4 hours PRN Dyspepsia  melatonin 3 milliGRAM(s) Oral at bedtime PRN Insomnia  ondansetron Injectable 4 milliGRAM(s) IV Push every 8 hours PRN Nausea and/or Vomiting      CAPILLARY BLOOD GLUCOSE        I&O's Summary    12 Sep 2023 07:01  -  13 Sep 2023 07:00  --------------------------------------------------------  IN: 460 mL / OUT: 300 mL / NET: 160 mL    13 Sep 2023 07:01  -  13 Sep 2023 13:18  --------------------------------------------------------  IN: 220 mL / OUT: 200 mL / NET: 20 mL        PHYSICAL EXAM:  Vital Signs Last 24 Hrs  T(C): 36.9 (13 Sep 2023 13:01), Max: 37.2 (13 Sep 2023 09:03)  T(F): 98.4 (13 Sep 2023 13:01), Max: 98.9 (13 Sep 2023 09:03)  HR: 60 (13 Sep 2023 13:01) (48 - 60)  BP: 120/54 (13 Sep 2023 13:01) (112/43 - 127/51)  BP(mean): --  RR: 18 (13 Sep 2023 13:01) (16 - 18)  SpO2: 97% (13 Sep 2023 13:01) (97% - 98%)    Parameters below as of 13 Sep 2023 13:01  Patient On (Oxygen Delivery Method): room air        CONSTITUTIONAL: NAD, well-developed  RESPIRATORY: Normal respiratory effort; lungs are clear to auscultation bilaterally  CARDIOVASCULAR: Regular rate and rhythm, normal S1 and S2, no murmur/rub/gallop; No lower extremity edema  ABDOMEN: Nontender to palpation, normoactive bowel sounds, no rebound/guarding  MUSCLOSKELETAL: no clubbing or cyanosis of digits; no joint swelling or tenderness to palpation  PSYCH: A+O to person, place, and time; affect appropriate    LABS:                        11.0   8.02  )-----------( 364      ( 13 Sep 2023 05:48 )             31.9     09-13    126<L>  |  94<L>  |  23  ----------------------------<  98  4.2   |  18<L>  |  1.43<H>    Ca    9.3      13 Sep 2023 05:48  Phos  3.6     09-13  Mg     2.0     09-13            Urinalysis Basic - ( 13 Sep 2023 05:48 )    Color: x / Appearance: x / SG: x / pH: x  Gluc: 98 mg/dL / Ketone: x  / Bili: x / Urobili: x   Blood: x / Protein: x / Nitrite: x   Leuk Esterase: x / RBC: x / WBC x   Sq Epi: x / Non Sq Epi: x / Bacteria: x  
[facility] DIVISION OF KIDNEY DISEASES AND HYPERTENSION -- FOLLOW UP NOTE  --------------------------------------------------------------------------------  Chief Complaint: weakness    24 hour events/subjective:  none      PAST HISTORY  --------------------------------------------------------------------------------  No significant changes to PMH, PSH, FHx, SHx, unless otherwise noted    ALLERGIES & MEDICATIONS  --------------------------------------------------------------------------------  Allergies    No Known Allergies    Intolerances      Standing Inpatient Medications  enoxaparin Injectable 40 milliGRAM(s) SubCutaneous every 24 hours  influenza  Vaccine (HIGH DOSE) 0.7 milliLiter(s) IntraMuscular once  levothyroxine 25 MICROGram(s) Oral daily  nebivolol 5 milliGRAM(s) Oral daily  pantoprazole    Tablet 40 milliGRAM(s) Oral before breakfast  polyethylene glycol 3350 17 Gram(s) Oral daily  senna 2 Tablet(s) Oral at bedtime  sodium chloride 2 Gram(s) Oral three times a day    PRN Inpatient Medications  acetaminophen     Tablet .. 650 milliGRAM(s) Oral every 6 hours PRN  aluminum hydroxide/magnesium hydroxide/simethicone Suspension 30 milliLiter(s) Oral every 4 hours PRN  melatonin 3 milliGRAM(s) Oral at bedtime PRN  ondansetron Injectable 4 milliGRAM(s) IV Push every 8 hours PRN      REVIEW OF SYSTEMS  --------------------------------------------------------------------------------  Gen: No weight changes, fatigue, fevers/chills, +weakness  Head/Eyes/Ears/Mouth: No headache; Normal hearing; Normal vision    Respiratory: No dyspnea, cough, wheezing, hemoptysis  CV: No chest pain, PND, orthopnea  GI: No abdominal pain, diarrhea, constipation, nausea, vomiting, melena, hematochezia  : No increased frequency, dysuria, hematuria, nocturia  MSK: No joint pain/swelling; no back pain; no edema   Heme: No easy bruising or bleeding  All other systems were reviewed and are negative, except as noted.    All other systems were reviewed and are negative, except as noted.    VITALS/PHYSICAL EXAM  --------------------------------------------------------------------------------  T(C): 36.9 (09-14-23 @ 12:00), Max: 37.1 (09-14-23 @ 04:49)  HR: 50 (09-14-23 @ 12:00) (50 - 57)  BP: 112/42 (09-14-23 @ 12:00) (109/54 - 121/62)  RR: 18 (09-14-23 @ 12:00) (18 - 18)  SpO2: 100% (09-14-23 @ 12:00) (95% - 100%)  Wt(kg): --        09-13-23 @ 07:01  -  09-14-23 @ 07:00  --------------------------------------------------------  IN: 320 mL / OUT: 750 mL / NET: -430 mL    09-14-23 @ 07:01  -  09-14-23 @ 14:35  --------------------------------------------------------  IN: 0 mL / OUT: 350 mL / NET: -350 mL      Physical Exam:  	PHYSICAL EXAM: vital signs as above  in no apparent distress  Neck: Supple, no JVD,    Lungs: no rhonchi, no wheeze, no crackles  CVS: S1 S2 no M/R/G  Abdomen: no tenderness, no organomegaly, BS present  Neuro: Grossly intact  Skin: warm, dry  Ext: no cyanosis or clubbing, no edema  Access:      LABS/STUDIES  --------------------------------------------------------------------------------              11.0   8.02  >-----------<  364      [09-13-23 @ 05:48]              31.9     129  |  98  |  26  ----------------------------<  100      [09-14-23 @ 07:11]  4.0   |  17  |  1.11        Ca     8.9     [09-14-23 @ 07:11]      Mg     2.0     [09-13-23 @ 05:48]      Phos  3.6     [09-13-23 @ 05:48]          Uric acid 3.8      [09-14-23 @ 07:11]    Creatinine Trend:  SCr 1.11 [09-14 @ 07:11]  SCr 1.19 [09-13 @ 21:11]  SCr 1.21 [09-13 @ 13:13]  SCr 1.43 [09-13 @ 05:48]  SCr 1.33 [09-12 @ 19:38]    Urinalysis - [09-14-23 @ 07:11]      Color  / Appearance  / SG  / pH       Gluc 100 / Ketone   / Bili  / Urobili        Blood  / Protein  / Leuk Est  / Nitrite       RBC  / WBC  / Hyaline  / Gran  / Sq Epi  / Non Sq Epi  / Bacteria     Urine Creatinine 23      [09-09-23 @ 00:40]  Urine Protein 15      [09-09-23 @ 00:40]  Urine Sodium 97      [09-12-23 @ 11:34]  Urine Urea Nitrogen 154      [09-09-23 @ 00:40]  Urine Potassium 38      [09-12-23 @ 11:34]  Urine Osmolality 410      [09-12-23 @ 11:36]    TSH 2.09      [09-14-23 @ 07:16]      
PROGRESS NOTE:   Authored by Dr. Naresh Maradiaga MD, Available on MS Teams    Patient is a 88y old  Female who presents with a chief complaint of abdominal pain (13 Sep 2023 13:13)      SUBJECTIVE / OVERNIGHT EVENTS: Patient feels okay except had some dizziness when taking salt tab. Had a BM this morning.     ADDITIONAL REVIEW OF SYSTEMS:    MEDICATIONS  (STANDING):  enoxaparin Injectable 40 milliGRAM(s) SubCutaneous every 24 hours  influenza  Vaccine (HIGH DOSE) 0.7 milliLiter(s) IntraMuscular once  levothyroxine 25 MICROGram(s) Oral daily  nebivolol 5 milliGRAM(s) Oral daily  pantoprazole    Tablet 40 milliGRAM(s) Oral before breakfast  polyethylene glycol 3350 17 Gram(s) Oral daily  senna 2 Tablet(s) Oral at bedtime  sodium chloride 2 Gram(s) Oral three times a day  sodium chloride 3%. 120 milliLiter(s) (30 mL/Hr) IV Continuous <Continuous>  traMADol 25 milliGRAM(s) Oral daily    MEDICATIONS  (PRN):  acetaminophen     Tablet .. 650 milliGRAM(s) Oral every 6 hours PRN Temp greater or equal to 38C (100.4F), Mild Pain (1 - 3)  aluminum hydroxide/magnesium hydroxide/simethicone Suspension 30 milliLiter(s) Oral every 4 hours PRN Dyspepsia  melatonin 3 milliGRAM(s) Oral at bedtime PRN Insomnia  ondansetron Injectable 4 milliGRAM(s) IV Push every 8 hours PRN Nausea and/or Vomiting      CAPILLARY BLOOD GLUCOSE        I&O's Summary    13 Sep 2023 07:01  -  14 Sep 2023 07:00  --------------------------------------------------------  IN: 320 mL / OUT: 750 mL / NET: -430 mL        PHYSICAL EXAM:  Vital Signs Last 24 Hrs  T(C): 37.1 (14 Sep 2023 04:49), Max: 37.2 (13 Sep 2023 09:03)  T(F): 98.7 (14 Sep 2023 04:49), Max: 98.9 (13 Sep 2023 09:03)  HR: 57 (14 Sep 2023 04:49) (48 - 60)  BP: 112/50 (14 Sep 2023 04:49) (109/54 - 127/51)  BP(mean): --  RR: 18 (14 Sep 2023 04:49) (18 - 18)  SpO2: 95% (14 Sep 2023 04:49) (95% - 98%)    Parameters below as of 14 Sep 2023 04:49  Patient On (Oxygen Delivery Method): room air        CONSTITUTIONAL: NAD  RESPIRATORY: Normal respiratory effort; lungs are clear to auscultation bilaterally  CARDIOVASCULAR: Regular rate and rhythm, normal S1 and S2, no murmur/rub/gallop; No lower extremity edema  ABDOMEN: Nontender to palpation, normoactive bowel sounds, no rebound/guarding  MUSCLOSKELETAL: no clubbing or cyanosis of digits; no joint swelling or tenderness to palpation  PSYCH: A+O to person, place, and time; affect appropriate    LABS:                        11.0   8.02  )-----------( 364      ( 13 Sep 2023 05:48 )             31.9     09-14    129<L>  |  98  |  26<H>  ----------------------------<  100<H>  4.0   |  17<L>  |  1.11    Ca    8.9      14 Sep 2023 07:11  Phos  3.6     09-13  Mg     2.0     09-13            Urinalysis Basic - ( 14 Sep 2023 07:11 )    Color: x / Appearance: x / SG: x / pH: x  Gluc: 100 mg/dL / Ketone: x  / Bili: x / Urobili: x   Blood: x / Protein: x / Nitrite: x   Leuk Esterase: x / RBC: x / WBC x   Sq Epi: x / Non Sq Epi: x / Bacteria: x
[facility] DIVISION OF KIDNEY DISEASES AND HYPERTENSION   FOLLOW UP NOTE    --------------------------------------------------------------------------------    SUBJECTIVE / ROS / INTERVAL EVENTS: Patient is asymptomatic, has no complaints. No acute events.        PAST HISTORY  --------------------------------------------------------------------------------  No significant changes to PMH, PSH, FHx, SHx, unless otherwise noted    ALLERGIES & MEDICATIONS  --------------------------------------------------------------------------------  Allergies    No Known Allergies    Intolerances      Standing Inpatient Medications  enoxaparin Injectable 40 milliGRAM(s) SubCutaneous every 24 hours  influenza  Vaccine (HIGH DOSE) 0.7 milliLiter(s) IntraMuscular once  levothyroxine 25 MICROGram(s) Oral daily  nebivolol 5 milliGRAM(s) Oral daily  pantoprazole    Tablet 40 milliGRAM(s) Oral before breakfast  polyethylene glycol 3350 17 Gram(s) Oral daily  senna 2 Tablet(s) Oral at bedtime  sodium chloride 3%. 120 milliLiter(s) IV Continuous <Continuous>  traMADol 25 milliGRAM(s) Oral daily    PRN Inpatient Medications  acetaminophen     Tablet .. 650 milliGRAM(s) Oral every 6 hours PRN  aluminum hydroxide/magnesium hydroxide/simethicone Suspension 30 milliLiter(s) Oral every 4 hours PRN  melatonin 3 milliGRAM(s) Oral at bedtime PRN  ondansetron Injectable 4 milliGRAM(s) IV Push every 8 hours PRN      VITALS  --------------------------------------------------------------------------------  T(C): 37.2 (09-13-23 @ 09:03), Max: 37.2 (09-13-23 @ 09:03)  HR: 48 (09-13-23 @ 09:03) (48 - 57)  BP: 127/51 (09-13-23 @ 09:03) (112/43 - 127/51)  RR: 18 (09-13-23 @ 09:03) (16 - 18)  SpO2: 97% (09-13-23 @ 09:03) (97% - 98%)  Wt(kg): --      09-12-23 @ 07:01  -  09-13-23 @ 07:00  --------------------------------------------------------  IN: 460 mL / OUT: 300 mL / NET: 160 mL    09-13-23 @ 07:01  -  09-13-23 @ 12:27  --------------------------------------------------------  IN: 220 mL / OUT: 200 mL / NET: 20 mL      PHYSICAL EXAM:  General: no acute distress  Neuro: no focal deficits  HEENT: anicteric, no JVD  Pulmonary: lungs CTA B/L  Cardiovascular/Chest: +S1S2, RRR, no murmurs/rubs/gallops  GI/Abdomen: soft, non-distended, non-tender, +bowel sounds  Extremities: no bilateral LE edema  Skin: Warm and dry    LABS/STUDIES  --------------------------------------------------------------------------------              11.0   8.02  >-----------<  364      [09-13-23 @ 05:48]              31.9     126  |  94  |  23  ----------------------------<  98      [09-13-23 @ 05:48]  4.2   |  18  |  1.43        Ca     9.3     [09-13-23 @ 05:48]      Mg     2.0     [09-13-23 @ 05:48]      Phos  3.6     [09-13-23 @ 05:48]            Creatinine Trend:  SCr 1.43 [09-13 @ 05:48]  SCr 1.33 [09-12 @ 19:38]  SCr 1.25 [09-12 @ 09:23]  SCr 1.35 [09-12 @ 01:48]  SCr 1.27 [09-11 @ 19:42]    Urinalysis - [09-13-23 @ 05:48]      Color  / Appearance  / SG  / pH       Gluc 98 / Ketone   / Bili  / Urobili        Blood  / Protein  / Leuk Est  / Nitrite       RBC  / WBC  / Hyaline  / Gran  / Sq Epi  / Non Sq Epi  / Bacteria     Urine Creatinine 23      [09-09-23 @ 00:40]  Urine Protein 15      [09-09-23 @ 00:40]  Urine Sodium 97      [09-12-23 @ 11:34]  Urine Urea Nitrogen 154      [09-09-23 @ 00:40]  Urine Potassium 38      [09-12-23 @ 11:34]  Urine Osmolality 410      [09-12-23 @ 11:36]  
______________  Karlo Roa MD  [facility]  [phone]    PROGRESS NOTE:     Patient is a 88y old  Female who presents with a chief complaint of abdominal pain (09 Sep 2023 20:06)      SUBJECTIVE / OVERNIGHT EVENTS: patient comfortable, states not sleeping well since admission    ADDITIONAL REVIEW OF SYSTEMS: negative unless noted above    MEDICATIONS  (STANDING):  enoxaparin Injectable 40 milliGRAM(s) SubCutaneous every 24 hours  influenza  Vaccine (HIGH DOSE) 0.7 milliLiter(s) IntraMuscular once  levothyroxine 25 MICROGram(s) Oral daily  nebivolol 5 milliGRAM(s) Oral daily  pantoprazole    Tablet 40 milliGRAM(s) Oral before breakfast  sodium chloride 1 Gram(s) Oral three times a day  traMADol 25 milliGRAM(s) Oral daily  trimethoprim  160 mG/sulfamethoxazole 800 mG 1 Tablet(s) Oral two times a day    MEDICATIONS  (PRN):  acetaminophen     Tablet .. 650 milliGRAM(s) Oral every 6 hours PRN Temp greater or equal to 38C (100.4F), Mild Pain (1 - 3)  aluminum hydroxide/magnesium hydroxide/simethicone Suspension 30 milliLiter(s) Oral every 4 hours PRN Dyspepsia  melatonin 3 milliGRAM(s) Oral at bedtime PRN Insomnia  ondansetron Injectable 4 milliGRAM(s) IV Push every 8 hours PRN Nausea and/or Vomiting    CAPILLARY BLOOD GLUCOSE    Vital Signs Last 24 Hrs  T(C): 36.6 (10 Sep 2023 09:51), Max: 36.9 (09 Sep 2023 19:24)  T(F): 97.8 (10 Sep 2023 09:51), Max: 98.5 (09 Sep 2023 19:24)  HR: 59 (10 Sep 2023 09:58) (59 - 73)  BP: 169/66 (10 Sep 2023 09:58) (134/64 - 169/77)  BP(mean): --  RR: 18 (10 Sep 2023 09:51) (18 - 18)  SpO2: 96% (10 Sep 2023 09:58) (95% - 100%)    Parameters below as of 10 Sep 2023 09:58  Patient On (Oxygen Delivery Method): room air    CONSTITUTIONAL: NAD, well-developed, well-groomed  EYES: conjunctiva and sclera clear  ENMT: normal  RESPIRATORY: normal respiratory effort; lungs are clear to auscultation bilaterally  CARDIOVASCULAR: S1S2, RRR  ABDOMEN: +BS, NTND  PSYCH: affect appropriate  NEUROLOGY: grossly normal  SKIN: no jaundice    LABS:                        11.7   6.42  )-----------( 292      ( 10 Sep 2023 03:43 )             32.8     09-10    120<LL>  |  86<L>  |  6<L>  ----------------------------<  137<H>  3.5   |  19<L>  |  0.71    Ca    8.6      10 Sep 2023 10:25  Phos  3.1     09-10  Mg     1.8     09-10    TPro  8.3  /  Alb  4.5  /  TBili  0.6  /  DBili  x   /  AST  32  /  ALT  19  /  AlkPhos  92  09-08          Urinalysis Basic - ( 10 Sep 2023 10:25 )    Color: x / Appearance: x / SG: x / pH: x  Gluc: 137 mg/dL / Ketone: x  / Bili: x / Urobili: x   Blood: x / Protein: x / Nitrite: x   Leuk Esterase: x / RBC: x / WBC x   Sq Epi: x / Non Sq Epi: x / Bacteria: x        Culture - Blood (collected 08 Sep 2023 21:51)  Source: .Blood Blood-Peripheral  Preliminary Report (10 Sep 2023 02:02):    No growth at 24 hours    Culture - Blood (collected 08 Sep 2023 21:45)  Source: .Blood Blood-Peripheral  Preliminary Report (10 Sep 2023 02:02):    No growth at 24 hours  
PROGRESS NOTE:   Authored by Dr. Naresh Maradiaga MD, Available on MS Teams    Patient is a 88y old  Female who presents with a chief complaint of abdominal pain (10 Sep 2023 22:44)      SUBJECTIVE / OVERNIGHT EVENTS: Translated by grand daughter at bedside. Patient feels very weak, fatigued. No chest pain or shortness of breath. Some lower abdominal discomfort     ADDITIONAL REVIEW OF SYSTEMS:    MEDICATIONS  (STANDING):  enoxaparin Injectable 40 milliGRAM(s) SubCutaneous every 24 hours  influenza  Vaccine (HIGH DOSE) 0.7 milliLiter(s) IntraMuscular once  levothyroxine 25 MICROGram(s) Oral daily  nebivolol 5 milliGRAM(s) Oral daily  pantoprazole    Tablet 40 milliGRAM(s) Oral before breakfast  polyethylene glycol 3350 17 Gram(s) Oral daily  senna 2 Tablet(s) Oral at bedtime  sodium chloride 3%. 120 milliLiter(s) (30 mL/Hr) IV Continuous <Continuous>  traMADol 25 milliGRAM(s) Oral daily  trimethoprim  160 mG/sulfamethoxazole 800 mG 1 Tablet(s) Oral two times a day    MEDICATIONS  (PRN):  acetaminophen     Tablet .. 650 milliGRAM(s) Oral every 6 hours PRN Temp greater or equal to 38C (100.4F), Mild Pain (1 - 3)  aluminum hydroxide/magnesium hydroxide/simethicone Suspension 30 milliLiter(s) Oral every 4 hours PRN Dyspepsia  melatonin 3 milliGRAM(s) Oral at bedtime PRN Insomnia  ondansetron Injectable 4 milliGRAM(s) IV Push every 8 hours PRN Nausea and/or Vomiting      CAPILLARY BLOOD GLUCOSE        I&O's Summary    10 Sep 2023 07:01  -  11 Sep 2023 07:00  --------------------------------------------------------  IN: 1050 mL / OUT: 980 mL / NET: 70 mL        PHYSICAL EXAM:  Vital Signs Last 24 Hrs  T(C): 36.7 (11 Sep 2023 13:16), Max: 37.3 (10 Sep 2023 20:22)  T(F): 98.1 (11 Sep 2023 13:16), Max: 99.1 (10 Sep 2023 20:22)  HR: 89 (11 Sep 2023 13:16) (61 - 89)  BP: 134/66 (11 Sep 2023 13:16) (106/60 - 157/73)  BP(mean): --  RR: 18 (11 Sep 2023 13:16) (18 - 18)  SpO2: 98% (11 Sep 2023 13:16) (96% - 100%)    Parameters below as of 11 Sep 2023 13:16  Patient On (Oxygen Delivery Method): room air        CONSTITUTIONAL: NAD  RESPIRATORY: Normal respiratory effort; lungs are clear to auscultation bilaterally  CARDIOVASCULAR: Regular rate and rhythm, normal S1 and S2, no murmur/rub/gallop; No lower extremity edema  ABDOMEN: Nontender to palpation, normoactive bowel sounds, no rebound/guarding  MUSCLOSKELETAL: no clubbing or cyanosis of digits; no joint swelling or tenderness to palpation  PSYCH: A+O to person, place, and time; affect appropriate    LABS:                        11.1   7.07  )-----------( 316      ( 11 Sep 2023 04:19 )             30.8     09-11    116<LL>  |  86<L>  |  7   ----------------------------<  103<H>  4.2   |  16<L>  |  0.78    Ca    8.2<L>      11 Sep 2023 12:20  Phos  3.1     09-10  Mg     1.8     09-10            Urinalysis Basic - ( 11 Sep 2023 12:20 )    Color: x / Appearance: x / SG: x / pH: x  Gluc: 103 mg/dL / Ketone: x  / Bili: x / Urobili: x   Blood: x / Protein: x / Nitrite: x   Leuk Esterase: x / RBC: x / WBC x   Sq Epi: x / Non Sq Epi: x / Bacteria: x        Culture - Urine (collected 08 Sep 2023 22:56)  Source: Clean Catch Clean Catch (Midstream)  Preliminary Report (11 Sep 2023 15:11):    Culture in progress    Culture - Blood (collected 08 Sep 2023 21:51)  Source: .Blood Blood-Peripheral  Preliminary Report (11 Sep 2023 02:01):    No growth at 48 Hours    Culture - Blood (collected 08 Sep 2023 21:45)  Source: .Blood Blood-Peripheral  Preliminary Report (11 Sep 2023 02:01):    No growth at 48 Hours  
PROGRESS NOTE:   Authored by Dr. Naresh Maradiaga MD, Available on MS Teams    Patient is a 88y old  Female who presents with a chief complaint of abdominal pain (11 Sep 2023 15:39)      SUBJECTIVE / OVERNIGHT EVENTS: No chest pain or shortness of breath. Patient feels tired. Has not had a BM in a few days.    ADDITIONAL REVIEW OF SYSTEMS:    MEDICATIONS  (STANDING):  enoxaparin Injectable 40 milliGRAM(s) SubCutaneous every 24 hours  influenza  Vaccine (HIGH DOSE) 0.7 milliLiter(s) IntraMuscular once  levothyroxine 25 MICROGram(s) Oral daily  nebivolol 5 milliGRAM(s) Oral daily  pantoprazole    Tablet 40 milliGRAM(s) Oral before breakfast  polyethylene glycol 3350 17 Gram(s) Oral daily  senna 2 Tablet(s) Oral at bedtime  sodium chloride 3%. 120 milliLiter(s) (30 mL/Hr) IV Continuous <Continuous>  traMADol 25 milliGRAM(s) Oral daily    MEDICATIONS  (PRN):  acetaminophen     Tablet .. 650 milliGRAM(s) Oral every 6 hours PRN Temp greater or equal to 38C (100.4F), Mild Pain (1 - 3)  aluminum hydroxide/magnesium hydroxide/simethicone Suspension 30 milliLiter(s) Oral every 4 hours PRN Dyspepsia  melatonin 3 milliGRAM(s) Oral at bedtime PRN Insomnia  ondansetron Injectable 4 milliGRAM(s) IV Push every 8 hours PRN Nausea and/or Vomiting      CAPILLARY BLOOD GLUCOSE        I&O's Summary    11 Sep 2023 07:01  -  12 Sep 2023 07:00  --------------------------------------------------------  IN: 400 mL / OUT: 0 mL / NET: 400 mL    12 Sep 2023 07:01  -  12 Sep 2023 13:42  --------------------------------------------------------  IN: 200 mL / OUT: 200 mL / NET: 0 mL        PHYSICAL EXAM:  Vital Signs Last 24 Hrs  T(C): 37 (12 Sep 2023 09:13), Max: 37.2 (12 Sep 2023 00:50)  T(F): 98.6 (12 Sep 2023 09:13), Max: 98.9 (12 Sep 2023 00:50)  HR: 52 (12 Sep 2023 09:13) (52 - 62)  BP: 106/50 (12 Sep 2023 09:13) (106/50 - 124/70)  BP(mean): --  RR: 18 (12 Sep 2023 09:13) (18 - 18)  SpO2: 93% (12 Sep 2023 09:13) (93% - 98%)    Parameters below as of 12 Sep 2023 09:13  Patient On (Oxygen Delivery Method): room air        CONSTITUTIONAL: NAD  RESPIRATORY: Normal respiratory effort; lungs are clear to auscultation bilaterally  CARDIOVASCULAR: Regular rate and rhythm, normal S1 and S2, no murmur/rub/gallop; No lower extremity edema  ABDOMEN: Nontender to palpation, normoactive bowel sounds, no rebound/guarding  MUSCLOSKELETAL: no clubbing or cyanosis of digits; no joint swelling or tenderness to palpation  PSYCH: A+O to person, place, and time; affect appropriate    LABS:                        11.1   7.07  )-----------( 316      ( 11 Sep 2023 04:19 )             30.8     09-12    121<L>  |  90<L>  |  16  ----------------------------<  94  4.4   |  17<L>  |  1.25    Ca    9.2      12 Sep 2023 09:23            Urinalysis Basic - ( 12 Sep 2023 09:23 )    Color: x / Appearance: x / SG: x / pH: x  Gluc: 94 mg/dL / Ketone: x  / Bili: x / Urobili: x   Blood: x / Protein: x / Nitrite: x   Leuk Esterase: x / RBC: x / WBC x   Sq Epi: x / Non Sq Epi: x / Bacteria: x

## 2023-09-14 NOTE — DISCHARGE NOTE PROVIDER - PROVIDER TOKENS
FREE:[LAST:[PCP],PHONE:[(   )    -],FAX:[(   )    -],FOLLOWUP:[1 week]] FREE:[LAST:[PCP],PHONE:[(   )    -],FAX:[(   )    -],FOLLOWUP:[1 week]],PROVIDER:[TOKEN:[[id number]],FOLLOWUP:[1 week]]

## 2023-09-14 NOTE — PROGRESS NOTE ADULT - ASSESSMENT
87 y/o F with PMH of HTN and osteoporosis presenting with complaints of  abdominal pain, dark blood in stool, one episode of diarrhea and found to be hyponatremic with Na 124, for which nephrology service was consulted.  COVID+ two weeks ago  Recent UTI tx
89 y/o F with PMH of HTN and osteoporosis presenting with complaints of  abdominal pain found to have hyponatremia requiring hypertonic saline. Also with UTI s/p abx
87 y/o F with PMH of HTN and osteoporosis presenting with complaints of  abdominal pain, dark blood in stool, one episode of diarrhea and found to be hyponatremic with Na 124, for which nephrology service was consulted.  COVID+ two weeks ago  Recent UTI tx
87 y/o F with PMH of HTN and osteoporosis presenting with complaints of  abdominal pain, dark blood in stool, one episode of diarrhea and found to be hyponatremic with Na 124, for which nephrology service was consulted.  COVID+ two weeks ago  Recent UTI tx
89 y/o F with PMH of HTN and osteoporosis presenting with complaints of  abdominal pain, dark blood in stool, one episode of diarrhea and found to be hyponatremic with Na 124, for which nephrology service was consulted.  COVID+ two weeks ago  Recent UTI tx
87 y/o F with PMH of HTN and osteoporosis presenting with complaints of  abdominal pain. Pt states for the last four days, having epigastric pain, on saturday pt noticed in BM dark blood in stool, pt also had one episode of diarrhea on saturday. Pt was recently dx with UTI finished course of treatment is still having burning upon urination.   
89 y/o F with PMH of HTN and osteoporosis presenting with complaints of  abdominal pain. Pt states for the last four days, having epigastric pain, on saturday pt noticed in BM dark blood in stool, pt also had one episode of diarrhea on saturday. Pt was recently dx with UTI finished course of treatment is still having burning upon urination.   
89 y/o F with PMH of HTN and osteoporosis presenting with complaints of  abdominal pain, dark blood in stool, one episode of diarrhea and found to be hyponatremic with Na 124, for which nephrology service was consulted.  COVID+ two weeks ago  Recent UTI tx
89 y/o F with PMH of HTN and osteoporosis presenting with complaints of  abdominal pain found to have hyponatremia requiring hypertonic saline. Also with UTI s/p abx
87 y/o F with PMH of HTN and osteoporosis presenting with complaints of  abdominal pain. Pt states for the last four days, having epigastric pain, on saturday pt noticed in BM dark blood in stool, pt also had one episode of diarrhea on saturday. Pt was recently dx with UTI

## 2023-09-14 NOTE — PROGRESS NOTE ADULT - PROBLEM SELECTOR PLAN 1
- 124 on admission, then downtrended. May have chronic, pt has been eating less that usual but still eating per granddaughter   - Na 129 this morning  - renal recs greatly appreciated  - continue salt tabs 2g TID  - check bmp q8  - CTH w/o acute findings  - f/u am cortisol, TSH

## 2023-09-14 NOTE — PROGRESS NOTE ADULT - PROVIDER SPECIALTY LIST ADULT
Nephrology
Hospitalist
Nephrology
Internal Medicine
Nephrology
Internal Medicine

## 2023-09-14 NOTE — PROGRESS NOTE ADULT - PROBLEM SELECTOR PLAN 2
May be pseudoAKI due to Bactrim.  Check Cystatin C gfr to confirm.    Thank you for involving us in the care of this patient. We will continue to follow.  Carlie Skinner,   Nephrology Fellow  Feel free to contact me directly on TEAMS with any additional questions.  (After 5pm or on weekends, please call the on-call fellow).
Moderate Leuk Est with sxs  - bactrim DS bid for 3 days   - UC pending
resolved . Likely due to bactrim    Darian Gunter MD  O: [phone]  Contact me on teams
Moderate Leuk Est with sxs  - bactrim DS bid for 3 days   - UC contaminant
- CT A/P No acute intra-abdominal or pelvic pathology.  - Hgb stable, trend   - Recently COVID +  - Unclear when last colonoscopy was  - laxatives, suppository as needed
Moderate Leuk Est with sxs  - bactrim DS bid for 3 days   - UC pending
Moderate Leuk Est with sxs  - bactrim DS bid for 3 days   - UC pending

## 2023-09-14 NOTE — DISCHARGE NOTE NURSING/CASE MANAGEMENT/SOCIAL WORK - NSDCPEFALRISK_GEN_ALL_CORE
For information on Fall & Injury Prevention, visit: [email or web address] OR  [email or web address] OR  https://www.cdc.gov/steadi/patient.html

## 2023-09-14 NOTE — PROGRESS NOTE ADULT - PROBLEM SELECTOR PLAN 1
Urine studies reviewed. 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   Uosm 203->299->410  Improved to 121 after 100cc bolus of 2% HTS and then 30cc/hr of 3% HTS x 4 hours (9/11)  Given another 30cc/hr of 3% HTS x 4 hours (9/12)  Salt tabs started 9/13    Plan:  Na improving   Maintain  NaCl tabs 2g TID . Patient to let us know if they make her nauseous.  Maintain strict fluid restriction  BMP q12  she will need to be dc on salt tabs  Etiology of SIADH remains unclear: TSH wnl. AM cortisol normal and CT head with no acute changes Urine studies reviewed. 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   Uosm 203->299->410  Improved to 121 after 100cc bolus of 2% HTS and then 30cc/hr of 3% HTS x 4 hours (9/11)  Given another 30cc/hr of 3% HTS x 4 hours (9/12)  Salt tabs started 9/13  TSH/Uric acid/am cortisol and C head negative    Plan:  Na improving   Maintain  NaCl tabs 2g TID . Patient to let us know if they make her nauseous.  Maintain strict fluid restriction  BMP q12  she will need to be dc on salt tabs

## 2023-09-14 NOTE — PROGRESS NOTE ADULT - PROBLEM SELECTOR PROBLEM 2
BLESSING (acute kidney injury)
BLESSING (acute kidney injury)
UTI (urinary tract infection), uncomplicated
UTI (urinary tract infection), uncomplicated
Abdominal pain
UTI (urinary tract infection), uncomplicated
UTI (urinary tract infection), uncomplicated

## 2023-09-14 NOTE — DISCHARGE NOTE PROVIDER - CARE PROVIDERS DIRECT ADDRESSES
,DirectAddress_Unknown ,DirectAddress_Unknown,[email or web address]

## 2023-09-14 NOTE — PROGRESS NOTE ADULT - PROBLEM SELECTOR PLAN 6
DVT ppx: lovenox 40  qd  Diet: regular diet  Dispo: pending clinical improvement    Discussed with patient and granddaughter at bedside
DVT ppx: lovenox 40  qd  Diet: CLD  Dispo: pending clinical improvement    Discussed with patient and granddaughter at bedside
DVT ppx: lovenox 40  qd  Diet: regular diet  Dispo: pending clinical improvement    Discussed with patient and granddaughter at bedside

## 2023-09-14 NOTE — DISCHARGE NOTE PROVIDER - HOSPITAL COURSE
HPI:  89 y/o F with PMH of HTN and osteoporosis presenting with complaints of  abdominal pain. Pt states for the last four days, having epigastric pain, on saturday pt noticed in BM dark blood in stool, pt also had one episode of diarrhea on saturday. Pt was recently dx with UTI finished course of treatment is still having burning upon urination. Pt c/o epigastric pain, nausea, SOB and overweakness. As per daughter pt seeems a bit confused at times, normally A&Ox4 at baseline, pt has also decreased PO intake do to abdominal discomfort and nausea. Recently COVID + last week. Unsure when last colonoscopy was done.    Pt speaks Belarusian, prefers granddaughter translation (09 Sep 2023 15:22)    Hospital Course:  Pt admitted with abdominal pain/diarrhea- CT A/P negative; stool culture negative.  Found to have a UTI, started on  PO Bactrim x3 days, completed.  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 DC with home PT.  Patient has been medically cleared for discharge as per Dr. Maradiaga. Patient has been given appropriate discharge instructions including medication regimen, access site management and follow up. Medications that patient needs refills on (+/- new medications) have been e-prescribed to preferred pharmacy. Patient will f/u with Dr. CEJA in 1-2 weeks for further management.      Important Medication Changes and Reason:    Active or Pending Issues Requiring Follow-up:    Advanced Directives:   [ x] Full code  [ ] DNR  [ ] Hospice    Discharge Diagnoses:  Abdominal pain/diarrhea  UTI  COVID (+) last week  Hyponatremia SIADH        HPI:  87 y/o F with PMH of HTN and osteoporosis presenting with complaints of  abdominal pain. Pt states for the last four days, having epigastric pain, on saturday pt noticed in BM dark blood in stool, pt also had one episode of diarrhea on saturday. Pt was recently dx with UTI finished course of treatment is still having burning upon urination. Pt c/o epigastric pain, nausea, SOB and overweakness. As per daughter pt seeems a bit confused at times, normally A&Ox4 at baseline, pt has also decreased PO intake do to abdominal discomfort and nausea. Recently COVID + last week. Unsure when last colonoscopy was done.    Pt speaks Hebrew, prefers granddaughter translation (09 Sep 2023 15:22)    Hospital Course:  Pt admitted with abdominal pain/diarrhea- CT A/P negative; stool culture negative.  Found to have a UTI, started on  PO Bactrim x3 days, completed.  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 DC with home PT.  Patient has been medically cleared for discharge as per Dr. Maradiaga. Patient has been given appropriate discharge instructions including medication regimen, access site management and follow up. Medications that patient needs refills on (+/- new medications) have been e-prescribed to preferred pharmacy. Patient will f/u with Dr. CEJA in 1-2 weeks for further management.      Important Medication Changes and Reason:    Active or Pending Issues Requiring Follow-up: hyponatremia     Advanced Directives:   [ x] Full code  [ ] DNR  [ ] Hospice    Discharge Diagnoses:  Abdominal pain/diarrhea  UTI  COVID (+) last week  Hyponatremia SIADH        HPI:  89 y/o F with PMH of HTN and osteoporosis presenting with complaints of  abdominal pain. Pt states for the last four days, having epigastric pain, on saturday pt noticed in BM dark blood in stool, pt also had one episode of diarrhea on saturday. Pt was recently dx with UTI finished course of treatment is still having burning upon urination. Pt c/o epigastric pain, nausea, SOB and overweakness. As per daughter pt seeems a bit confused at times, normally A&Ox4 at baseline, pt has also decreased PO intake do to abdominal discomfort and nausea. Recently COVID + last week. Unsure when last colonoscopy was done.    Pt speaks Lithuanian, prefers granddaughter translation (09 Sep 2023 15:22)    Hospital Course:  Pt admitted with abdominal pain/diarrhea- CT A/P negative; stool culture negative.  Found to have a UTI, started on  PO Bactrim x3 days, completed.  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 DC with home PT.  Patient has been medically cleared for discharge as per Dr. Maradiaga. Patient has been given appropriate discharge instructions including medication regimen, access site management and follow up. Medications that patient needs refills on (+/- new medications) have been e-prescribed to preferred pharmacy. Patient will f/u with Nephrologist and PCP in 1-2 weeks for further managementagement.    She was seen by Nephrologist and PT  Important Medication Changes and Reason:    Active or Pending Issues Requiring Follow-up: hyponatremia     Advanced Directives:   [ x] Full code  [ ] DNR  [ ] Hospice    Discharge Diagnoses:  Abdominal pain/diarrhea  UTI  COVID (+) last week  Hyponatremia SIADH        HPI:  89 y/o F with PMH of HTN and osteoporosis presenting with complaints of  abdominal pain. Pt states for the last four days, having epigastric pain, on saturday pt noticed in BM dark blood in stool, pt also had one episode of diarrhea on saturday. Pt was recently dx with UTI finished course of treatment is still having burning upon urination. Pt c/o epigastric pain, nausea, SOB and overweakness. As per daughter pt seeems a bit confused at times, normally A&Ox4 at baseline, pt has also decreased PO intake do to abdominal discomfort and nausea. Recently COVID + last week. Unsure when last colonoscopy was done.    Pt speaks Slovak, prefers granddaughter translation (09 Sep 2023 15:22)    Hospital Course:  Pt admitted with abdominal pain/diarrhea- CT A/P negative; stool culture negative.  Found to have a UTI, started on  PO Bactrim x3 days, completed.  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 DC with home PT.  Patient has been medically cleared for discharge as per Dr. Maradiaga. Patient has been given appropriate discharge instructions including medication regimen, access site management and follow up. Medications that patient needs refills on (+/- new medications) have been e-prescribed to preferred pharmacy. Patient will f/u with Nephrologist and PCP in 1-2 weeks for further management.    She was seen by Nephrologist and PT  Important Medication Changes and Reason:    Active or Pending Issues Requiring Follow-up: hyponatremia     Advanced Directives:   [ x] Full code  [ ] DNR  [ ] Hospice    Discharge Diagnoses:  Abdominal pain/diarrhea  UTI  COVID (+) last week  Hyponatremia SIADH

## 2023-09-15 VITALS
OXYGEN SATURATION: 100 % | HEART RATE: 45 BPM | SYSTOLIC BLOOD PRESSURE: 115 MMHG | TEMPERATURE: 98 F | DIASTOLIC BLOOD PRESSURE: 55 MMHG | RESPIRATION RATE: 18 BRPM

## 2023-09-15 LAB
ANION GAP SERPL CALC-SCNC: 16 MMOL/L — SIGNIFICANT CHANGE UP (ref 5–17)
BUN SERPL-MCNC: 28 MG/DL — HIGH (ref 7–23)
CALCIUM SERPL-MCNC: 9 MG/DL — SIGNIFICANT CHANGE UP (ref 8.4–10.5)
CHLORIDE SERPL-SCNC: 101 MMOL/L — SIGNIFICANT CHANGE UP (ref 96–108)
CO2 SERPL-SCNC: 17 MMOL/L — LOW (ref 22–31)
CORTIS AM PEAK SERPL-MCNC: 13.1 UG/DL — SIGNIFICANT CHANGE UP (ref 6–18.4)
CREAT SERPL-MCNC: 0.99 MG/DL — SIGNIFICANT CHANGE UP (ref 0.5–1.3)
EGFR: 55 ML/MIN/1.73M2 — LOW
GLUCOSE SERPL-MCNC: 110 MG/DL — HIGH (ref 70–99)
POTASSIUM SERPL-MCNC: 4.1 MMOL/L — SIGNIFICANT CHANGE UP (ref 3.5–5.3)
POTASSIUM SERPL-SCNC: 4.1 MMOL/L — SIGNIFICANT CHANGE UP (ref 3.5–5.3)
SODIUM SERPL-SCNC: 134 MMOL/L — LOW (ref 135–145)

## 2023-09-15 RX ORDER — NEBIVOLOL HYDROCHLORIDE 5 MG/1
1 TABLET ORAL
Qty: 30 | Refills: 0
Start: 2023-09-15 | End: 2023-10-14

## 2023-09-15 RX ORDER — SODIUM CHLORIDE 9 MG/ML
2 INJECTION INTRAMUSCULAR; INTRAVENOUS; SUBCUTANEOUS
Qty: 180 | Refills: 0
Start: 2023-09-15 | End: 2023-10-14

## 2023-09-15 RX ORDER — PANTOPRAZOLE SODIUM 20 MG/1
1 TABLET, DELAYED RELEASE ORAL
Qty: 30 | Refills: 0
Start: 2023-09-15 | End: 2023-10-14

## 2023-09-15 RX ADMIN — POLYETHYLENE GLYCOL 3350 17 GRAM(S): 17 POWDER, FOR SOLUTION ORAL at 14:15

## 2023-09-15 RX ADMIN — PANTOPRAZOLE SODIUM 40 MILLIGRAM(S): 20 TABLET, DELAYED RELEASE ORAL at 05:44

## 2023-09-15 RX ADMIN — NEBIVOLOL HYDROCHLORIDE 5 MILLIGRAM(S): 5 TABLET ORAL at 09:43

## 2023-09-15 RX ADMIN — Medication 25 MICROGRAM(S): at 05:43

## 2023-09-15 RX ADMIN — SODIUM CHLORIDE 2 GRAM(S): 9 INJECTION INTRAMUSCULAR; INTRAVENOUS; SUBCUTANEOUS at 09:42

## 2023-09-15 RX ADMIN — SODIUM CHLORIDE 2 GRAM(S): 9 INJECTION INTRAMUSCULAR; INTRAVENOUS; SUBCUTANEOUS at 17:44

## 2023-09-15 RX ADMIN — SODIUM CHLORIDE 2 GRAM(S): 9 INJECTION INTRAMUSCULAR; INTRAVENOUS; SUBCUTANEOUS at 14:15

## 2023-09-20 PROBLEM — Z00.00 ENCOUNTER FOR PREVENTIVE HEALTH EXAMINATION: Status: ACTIVE | Noted: 2023-09-20

## 2023-09-29 ENCOUNTER — LABORATORY RESULT (OUTPATIENT)
Age: 88
End: 2023-09-29

## 2023-09-29 ENCOUNTER — APPOINTMENT (OUTPATIENT)
Dept: NEPHROLOGY | Facility: CLINIC | Age: 88
End: 2023-09-29
Payer: SELF-PAY

## 2023-09-29 VITALS
HEART RATE: 57 BPM | OXYGEN SATURATION: 100 % | TEMPERATURE: 97.9 F | SYSTOLIC BLOOD PRESSURE: 177 MMHG | DIASTOLIC BLOOD PRESSURE: 62 MMHG | WEIGHT: 89.06 LBS

## 2023-09-29 VITALS — SYSTOLIC BLOOD PRESSURE: 152 MMHG | DIASTOLIC BLOOD PRESSURE: 60 MMHG

## 2023-09-29 DIAGNOSIS — K21.9 GASTRO-ESOPHAGEAL REFLUX DISEASE W/OUT ESOPHAGITIS: ICD-10-CM

## 2023-09-29 DIAGNOSIS — E03.9 HYPOTHYROIDISM, UNSPECIFIED: ICD-10-CM

## 2023-09-29 DIAGNOSIS — E87.1 HYPO-OSMOLALITY AND HYPONATREMIA: ICD-10-CM

## 2023-09-29 DIAGNOSIS — I10 ESSENTIAL (PRIMARY) HYPERTENSION: ICD-10-CM

## 2023-09-29 PROCEDURE — 99214 OFFICE O/P EST MOD 30 MIN: CPT

## 2023-09-29 RX ORDER — NEBIVOLOL 5 MG/1
5 TABLET ORAL DAILY
Refills: 0 | Status: ACTIVE | COMMUNITY
Start: 2023-09-29

## 2023-09-29 RX ORDER — PANTOPRAZOLE SODIUM 40 MG/1
40 GRANULE, DELAYED RELEASE ORAL
Refills: 0 | Status: ACTIVE | COMMUNITY
Start: 2023-09-29

## 2023-10-02 PROBLEM — K21.9 GERD (GASTROESOPHAGEAL REFLUX DISEASE): Status: ACTIVE | Noted: 2023-09-29

## 2023-10-02 PROBLEM — E03.9 HYPOTHYROIDISM, UNSPECIFIED TYPE: Status: ACTIVE | Noted: 2023-10-02

## 2023-10-02 LAB
ALBUMIN SERPL ELPH-MCNC: 4.7 G/DL
ANION GAP SERPL CALC-SCNC: 15 MMOL/L
APPEARANCE: CLEAR
BILIRUBIN URINE: NEGATIVE
BLOOD URINE: ABNORMAL
BUN SERPL-MCNC: 17 MG/DL
CALCIUM SERPL-MCNC: 9.8 MG/DL
CHLORIDE SERPL-SCNC: 98 MMOL/L
CO2 SERPL-SCNC: 25 MMOL/L
COLOR: YELLOW
CREAT SERPL-MCNC: 0.93 MG/DL
CREAT SPEC-SCNC: 61 MG/DL
EGFR: 59 ML/MIN/1.73M2
GLUCOSE QUALITATIVE U: NEGATIVE MG/DL
GLUCOSE SERPL-MCNC: 100 MG/DL
HCT VFR BLD CALC: 38.7 %
HGB BLD-MCNC: 12.7 G/DL
KETONES URINE: NEGATIVE MG/DL
LEUKOCYTE ESTERASE URINE: ABNORMAL
MCHC RBC-ENTMCNC: 30.5 PG
MCHC RBC-ENTMCNC: 32.8 GM/DL
MCV RBC AUTO: 92.8 FL
MICROALBUMIN 24H UR DL<=1MG/L-MCNC: <1.2 MG/DL
MICROALBUMIN/CREAT 24H UR-RTO: NORMAL MG/G
NITRITE URINE: NEGATIVE
OSMOLALITY SERPL: 289 MOSMOL/KG
OSMOLALITY UR: 388 MOSM/KG
PH URINE: 6.5
PHOSPHATE SERPL-MCNC: 4.2 MG/DL
PLATELET # BLD AUTO: 179 K/UL
POTASSIUM SERPL-SCNC: 4.3 MMOL/L
PROTEIN URINE: NORMAL MG/DL
RBC # BLD: 4.17 M/UL
RBC # FLD: 13.9 %
SODIUM ?TM SUB UR QN: 77 MMOL/L
SODIUM SERPL-SCNC: 138 MMOL/L
SPECIFIC GRAVITY URINE: 1.01
UROBILINOGEN URINE: 0.2 MG/DL
WBC # FLD AUTO: 8.86 K/UL

## 2023-12-20 ENCOUNTER — APPOINTMENT (OUTPATIENT)
Dept: NEPHROLOGY | Facility: CLINIC | Age: 88
End: 2023-12-20

## 2024-01-26 PROBLEM — E03.9 HYPOTHYROIDISM, UNSPECIFIED: Chronic | Status: ACTIVE | Noted: 2023-08-14

## 2024-01-26 PROBLEM — I10 ESSENTIAL (PRIMARY) HYPERTENSION: Chronic | Status: ACTIVE | Noted: 2023-08-14

## 2024-01-26 RX ORDER — NEBIVOLOL HYDROCHLORIDE 5 MG/1
1 TABLET ORAL
Refills: 0 | DISCHARGE

## 2024-01-26 RX ORDER — TRAMADOL HYDROCHLORIDE AND ACETAMINOPHEN 37.5; 325 MG/1; MG/1
1 TABLET ORAL
Refills: 0 | DISCHARGE

## 2024-01-26 RX ORDER — LEVOTHYROXINE SODIUM 125 MCG
1 TABLET ORAL
Refills: 0 | DISCHARGE

## 2024-04-08 NOTE — ED PROVIDER NOTE - CARE PLAN
- Diet: Diabetic Diet + Shade TIDWM  - IS ordered   - Pain Regimen: Tylenol PRN, Ibuprofen PRN, Oxy IR 5/10 PRN   - Anti-Emetics: Zofran PRN, Compazine PRN    - Bowel Regimen: Senna qD, MoM PRN   - Consults: PT/OT (while inpatient)    1 Principal Discharge DX:	Abdominal pain

## 2024-06-30 NOTE — DISCHARGE NOTE PROVIDER - DISCHARGE DATE
Goal Outcome Evaluation:                                              
Goal Outcome Evaluation:            Patient has been AO to self only. Patient's daughter at bedside through the night. Patient has external catheter. Turned q2hrs. Confused but easy to direct. Patient has slept most of the night.                                  
Goal Outcome Evaluation:         Patient has been awake off and on this evening. Does not ask for anything. Still not eating and only drinking enough to swallow little pills. Patient's daughter at bedside.                                     
Goal Outcome Evaluation:  Plan of Care Reviewed With: patient        Progress: improving  Outcome Evaluation: Patient is discharging per MD.                               
Goal Outcome Evaluation:  Plan of Care Reviewed With: patient        Progress: no change  Outcome Evaluation: Patient shows no s/s of distress and vitals are stable. Pt voiced no concerns this shift. Bed alarm on and call light within reach.                               
Goal Outcome Evaluation:  Plan of Care Reviewed With: patient, spouse, daughter           Outcome Evaluation: Pt is a 68 YO F admitted with decreased appetite, decreased want to smoke, constipation, and confusion. Pt spouse states that this has been going on for 3 days prior to admission. Pt lives at home with spouse, who works during the day, is independent with ambulation using rollator and has had multiple falls. Pt this date with mild confusion, but spouse states this is near baseline. Pt with global weakness and impaired balance when ambluating wiht RWx. Pt appears below baseline, remains a high falls risk and does not appear safe to return home at this time, recommendaiton is SNF at d/c. Pt to continue to follow.      Anticipated Discharge Disposition (PT): skilled nursing facility                        
14-Sep-2023

## 2025-01-20 NOTE — PATIENT PROFILE ADULT - FALL HARM RISK - FACTORS
Received request via: Patient    Was the patient seen in the last year in this department? Yes    Does the patient have an active prescription (recently filled or refills available) for medication(s) requested? No    Pharmacy Name: [facility]    Does the patient have half-way Plus and need 100-day supply? (This applies to ALL medications) Patient does not have SCP  
Other